# Patient Record
Sex: FEMALE | Race: WHITE | Employment: OTHER | URBAN - METROPOLITAN AREA
[De-identification: names, ages, dates, MRNs, and addresses within clinical notes are randomized per-mention and may not be internally consistent; named-entity substitution may affect disease eponyms.]

---

## 2017-01-16 ENCOUNTER — HOSPITAL ENCOUNTER (OUTPATIENT)
Dept: CT IMAGING | Age: 67
Discharge: HOME | End: 2017-01-16
Attending: INTERNAL MEDICINE
Payer: COMMERCIAL

## 2017-01-16 VITALS — HEIGHT: 67 IN | WEIGHT: 150 LBS | BODY MASS INDEX: 23.54 KG/M2

## 2017-01-16 DIAGNOSIS — R10.31 ABDOMINAL PAIN, RLQ: ICD-10-CM

## 2017-01-16 PROCEDURE — 74011000258 HC RX REV CODE- 258

## 2017-01-16 PROCEDURE — 74011636320 HC RX REV CODE- 636/320

## 2017-01-16 PROCEDURE — 74177 CT ABD & PELVIS W/CONTRAST: CPT

## 2017-01-16 RX ORDER — SODIUM CHLORIDE 0.9 % (FLUSH) 0.9 %
10 SYRINGE (ML) INJECTION
Status: COMPLETED | OUTPATIENT
Start: 2017-01-16 | End: 2017-01-16

## 2017-01-16 RX ADMIN — Medication 10 ML: at 15:20

## 2017-01-16 RX ADMIN — IOVERSOL 100 ML: 741 INJECTION INTRA-ARTERIAL; INTRAVENOUS at 15:20

## 2017-01-16 RX ADMIN — SODIUM CHLORIDE 100 ML: 900 INJECTION, SOLUTION INTRAVENOUS at 15:20

## 2017-01-16 RX ADMIN — DIATRIZOATE MEGLUMINE AND DIATRIZOATE SODIUM 15 ML: 660; 100 LIQUID ORAL; RECTAL at 15:20

## 2017-06-30 ENCOUNTER — HOSPITAL ENCOUNTER (OUTPATIENT)
Dept: MAMMOGRAPHY | Age: 67
Discharge: HOME | End: 2017-06-30
Attending: INTERNAL MEDICINE
Payer: COMMERCIAL

## 2017-06-30 DIAGNOSIS — Z12.31 ENCOUNTER FOR SCREENING MAMMOGRAM FOR BREAST CANCER: ICD-10-CM

## 2017-06-30 PROCEDURE — 77067 SCR MAMMO BI INCL CAD: CPT

## 2017-10-23 ENCOUNTER — HOSPITAL ENCOUNTER (OUTPATIENT)
Dept: CT IMAGING | Age: 67
Discharge: HOME | End: 2017-10-23
Attending: INTERNAL MEDICINE
Payer: SELF-PAY

## 2017-10-23 DIAGNOSIS — Z13.6 SCREENING FOR CARDIOVASCULAR CONDITION: ICD-10-CM

## 2017-10-23 PROCEDURE — 75571 CT HRT W/O DYE W/CA TEST: CPT

## 2017-10-24 NOTE — PROGRESS NOTES
Your heart calcium is ZERO! This is the best it can be! It doesn't 100% rule out blockages in your heart arteries, but it argues that holding off on statin medications is an acceptable plan for you. I know you must be relieved.

## 2018-02-13 PROBLEM — Z17.0 MALIGNANT NEOPLASM OF UPPER-OUTER QUADRANT OF LEFT BREAST IN FEMALE, ESTROGEN RECEPTOR POSITIVE (HCC): Status: ACTIVE | Noted: 2018-02-13

## 2018-02-13 PROBLEM — C50.412 MALIGNANT NEOPLASM OF UPPER-OUTER QUADRANT OF LEFT BREAST IN FEMALE, ESTROGEN RECEPTOR POSITIVE (HCC): Status: ACTIVE | Noted: 2018-02-13

## 2018-02-20 ENCOUNTER — HOSPITAL ENCOUNTER (OUTPATIENT)
Dept: ULTRASOUND IMAGING | Age: 68
Discharge: HOME | End: 2018-02-20
Attending: INTERNAL MEDICINE
Payer: MEDICARE

## 2018-02-20 DIAGNOSIS — E04.2 MULTIPLE THYROID NODULES: ICD-10-CM

## 2018-02-20 PROCEDURE — 76536 US EXAM OF HEAD AND NECK: CPT

## 2018-02-20 NOTE — PROGRESS NOTES
Your ultrasound looked good--the nodules do NOT appear concerning to the radiologist. We can consider repeating this test in several years for a spot check.

## 2018-03-20 PROBLEM — R20.0 THIGH NUMBNESS: Status: ACTIVE | Noted: 2018-03-20

## 2018-03-20 PROBLEM — M25.551 PAIN OF RIGHT HIP JOINT: Status: ACTIVE | Noted: 2018-03-20

## 2018-03-20 PROBLEM — G62.9 NEUROPATHY: Status: ACTIVE | Noted: 2018-03-20

## 2018-03-20 PROBLEM — R51.9 SCALP PAIN: Status: ACTIVE | Noted: 2018-03-20

## 2018-03-20 PROBLEM — L29.9 SCALP ITCH: Status: ACTIVE | Noted: 2018-03-20

## 2018-03-20 PROBLEM — Z79.899 ENCOUNTER FOR MEDICATION MANAGEMENT: Status: ACTIVE | Noted: 2018-03-20

## 2018-07-25 PROBLEM — G62.9 SENSORY NEUROPATHY: Status: ACTIVE | Noted: 2018-07-25

## 2018-10-01 PROBLEM — M79.2 NEUROPATHIC PAIN: Status: ACTIVE | Noted: 2018-10-01

## 2019-01-03 ENCOUNTER — HOSPITAL ENCOUNTER (OUTPATIENT)
Dept: MAMMOGRAPHY | Age: 69
Discharge: HOME | End: 2019-01-03
Attending: INTERNAL MEDICINE
Payer: MEDICARE

## 2019-01-03 DIAGNOSIS — Z12.39 SCREENING FOR BREAST CANCER: ICD-10-CM

## 2019-01-03 PROCEDURE — 77067 SCR MAMMO BI INCL CAD: CPT

## 2019-08-30 ENCOUNTER — HOSPITAL ENCOUNTER (OUTPATIENT)
Dept: CT IMAGING | Age: 69
Discharge: HOME | End: 2019-08-30
Attending: INTERNAL MEDICINE

## 2019-08-30 DIAGNOSIS — R10.9 RIGHT SIDED ABDOMINAL PAIN: ICD-10-CM

## 2019-08-30 RX ORDER — SODIUM CHLORIDE 0.9 % (FLUSH) 0.9 %
10 SYRINGE (ML) INJECTION
Status: COMPLETED | OUTPATIENT
Start: 2019-08-30 | End: 2019-08-30

## 2019-08-30 RX ADMIN — Medication 10 ML: at 09:56

## 2019-08-30 NOTE — PROGRESS NOTES
Your CT scan is normal.  Very reassuring that the pain is from scar tissue/adhesions and NOT an overlooked problem. Glad we checked for certainty.

## 2019-12-05 ENCOUNTER — HOSPITAL ENCOUNTER (OUTPATIENT)
Dept: LAB | Age: 69
Discharge: HOME | End: 2019-12-05
Payer: MEDICARE

## 2019-12-05 LAB — TSH SERPL DL<=0.005 MIU/L-ACNC: 0.94 UIU/ML (ref 0.36–3.74)

## 2019-12-05 PROCEDURE — 84443 ASSAY THYROID STIM HORMONE: CPT

## 2020-01-06 ENCOUNTER — HOSPITAL ENCOUNTER (OUTPATIENT)
Dept: MAMMOGRAPHY | Age: 70
Discharge: HOME | End: 2020-01-06
Attending: INTERNAL MEDICINE
Payer: MEDICARE

## 2020-01-06 DIAGNOSIS — Z12.31 ENCOUNTER FOR SCREENING MAMMOGRAM FOR MALIGNANT NEOPLASM OF BREAST: ICD-10-CM

## 2020-01-06 DIAGNOSIS — Z12.39 SCREENING FOR BREAST CANCER: ICD-10-CM

## 2020-01-06 PROCEDURE — 77063 BREAST TOMOSYNTHESIS BI: CPT

## 2020-06-10 ENCOUNTER — HOSPITAL ENCOUNTER (OUTPATIENT)
Dept: LAB | Age: 70
Discharge: HOME | End: 2020-06-10
Payer: MEDICARE

## 2020-06-10 DIAGNOSIS — E78.00 PURE HYPERCHOLESTEROLEMIA: ICD-10-CM

## 2020-06-10 DIAGNOSIS — E04.1 THYROID NODULE: ICD-10-CM

## 2020-06-10 DIAGNOSIS — E55.9 VITAMIN D DEFICIENCY: ICD-10-CM

## 2020-06-10 LAB — TSH SERPL DL<=0.005 MIU/L-ACNC: 0.81 UIU/ML (ref 0.36–3.74)

## 2020-06-10 PROCEDURE — 84443 ASSAY THYROID STIM HORMONE: CPT

## 2020-12-16 ENCOUNTER — TRANSCRIBE ORDER (OUTPATIENT)
Dept: SCHEDULING | Age: 70
End: 2020-12-16

## 2020-12-16 DIAGNOSIS — Z12.31 ENCOUNTER FOR SCREENING MAMMOGRAM FOR MALIGNANT NEOPLASM OF BREAST: Primary | ICD-10-CM

## 2021-01-07 ENCOUNTER — HOSPITAL ENCOUNTER (OUTPATIENT)
Dept: MAMMOGRAPHY | Age: 71
Discharge: HOME | End: 2021-01-07
Attending: INTERNAL MEDICINE
Payer: MEDICARE

## 2021-01-07 DIAGNOSIS — Z12.31 ENCOUNTER FOR SCREENING MAMMOGRAM FOR MALIGNANT NEOPLASM OF BREAST: ICD-10-CM

## 2021-01-07 PROCEDURE — 77063 BREAST TOMOSYNTHESIS BI: CPT

## 2021-12-27 ENCOUNTER — TRANSCRIBE ORDER (OUTPATIENT)
Dept: SCHEDULING | Age: 71
End: 2021-12-27

## 2021-12-27 DIAGNOSIS — Z12.31 SCREENING MAMMOGRAM FOR HIGH-RISK PATIENT: Primary | ICD-10-CM

## 2022-01-11 ENCOUNTER — HOSPITAL ENCOUNTER (OUTPATIENT)
Dept: MAMMOGRAPHY | Age: 72
Discharge: HOME | End: 2022-01-11
Attending: INTERNAL MEDICINE
Payer: MEDICARE

## 2022-01-11 DIAGNOSIS — Z12.31 SCREENING MAMMOGRAM FOR HIGH-RISK PATIENT: ICD-10-CM

## 2022-01-11 PROCEDURE — 77063 BREAST TOMOSYNTHESIS BI: CPT

## 2022-03-18 PROBLEM — G62.9 SENSORY NEUROPATHY: Status: ACTIVE | Noted: 2018-07-25

## 2022-03-18 PROBLEM — L29.9 SCALP ITCH: Status: ACTIVE | Noted: 2018-03-20

## 2022-03-19 PROBLEM — G62.9 NEUROPATHY: Status: ACTIVE | Noted: 2018-03-20

## 2022-03-19 PROBLEM — C50.412 MALIGNANT NEOPLASM OF UPPER-OUTER QUADRANT OF LEFT BREAST IN FEMALE, ESTROGEN RECEPTOR POSITIVE (HCC): Status: ACTIVE | Noted: 2018-02-13

## 2022-03-19 PROBLEM — Z79.899 ENCOUNTER FOR MEDICATION MANAGEMENT: Status: ACTIVE | Noted: 2018-03-20

## 2022-03-19 PROBLEM — R20.0 THIGH NUMBNESS: Status: ACTIVE | Noted: 2018-03-20

## 2022-03-19 PROBLEM — R51.9 SCALP PAIN: Status: ACTIVE | Noted: 2018-03-20

## 2022-03-19 PROBLEM — Z17.0 MALIGNANT NEOPLASM OF UPPER-OUTER QUADRANT OF LEFT BREAST IN FEMALE, ESTROGEN RECEPTOR POSITIVE (HCC): Status: ACTIVE | Noted: 2018-02-13

## 2022-03-20 PROBLEM — M79.2 NEUROPATHIC PAIN: Status: ACTIVE | Noted: 2018-10-01

## 2022-03-20 PROBLEM — M25.551 PAIN OF RIGHT HIP JOINT: Status: ACTIVE | Noted: 2018-03-20

## 2023-02-13 ENCOUNTER — HOSPITAL ENCOUNTER (EMERGENCY)
Age: 73
Discharge: HOME | End: 2023-02-13
Attending: STUDENT IN AN ORGANIZED HEALTH CARE EDUCATION/TRAINING PROGRAM
Payer: MEDICARE

## 2023-02-13 VITALS
HEART RATE: 96 BPM | SYSTOLIC BLOOD PRESSURE: 135 MMHG | HEIGHT: 65 IN | WEIGHT: 148 LBS | BODY MASS INDEX: 24.66 KG/M2 | TEMPERATURE: 97.8 F | RESPIRATION RATE: 23 BRPM | DIASTOLIC BLOOD PRESSURE: 89 MMHG | OXYGEN SATURATION: 96 %

## 2023-02-13 DIAGNOSIS — R11.2 NAUSEA AND VOMITING, UNSPECIFIED VOMITING TYPE: Primary | ICD-10-CM

## 2023-02-13 LAB
ALBUMIN SERPL-MCNC: 3.4 G/DL (ref 3.2–4.6)
ALBUMIN/GLOB SERPL: 0.9 (ref 0.4–1.6)
ALP SERPL-CCNC: 125 U/L (ref 50–136)
ALT SERPL-CCNC: 20 U/L (ref 12–65)
ANION GAP SERPL CALC-SCNC: 5 MMOL/L (ref 2–11)
APPEARANCE UR: CLEAR
AST SERPL-CCNC: 17 U/L (ref 15–37)
BASOPHILS # BLD: 0 K/UL (ref 0–0.2)
BASOPHILS NFR BLD: 1 % (ref 0–2)
BILIRUB SERPL-MCNC: 0.7 MG/DL (ref 0.2–1.1)
BILIRUB UR QL: NEGATIVE
BUN SERPL-MCNC: 9 MG/DL (ref 8–23)
CALCIUM SERPL-MCNC: 9 MG/DL (ref 8.3–10.4)
CHLORIDE SERPL-SCNC: 105 MMOL/L (ref 101–110)
CO2 SERPL-SCNC: 29 MMOL/L (ref 21–32)
COLOR UR: NORMAL
CREAT SERPL-MCNC: 0.62 MG/DL (ref 0.6–1)
DIFFERENTIAL METHOD BLD: ABNORMAL
EKG ATRIAL RATE: 87 BPM
EKG DIAGNOSIS: NORMAL
EKG P AXIS: 12 DEGREES
EKG P-R INTERVAL: 186 MS
EKG Q-T INTERVAL: 412 MS
EKG QRS DURATION: 101 MS
EKG QTC CALCULATION (BAZETT): 496 MS
EKG R AXIS: -22 DEGREES
EKG T AXIS: 35 DEGREES
EKG VENTRICULAR RATE: 87 BPM
EOSINOPHIL # BLD: 0.1 K/UL (ref 0–0.8)
EOSINOPHIL NFR BLD: 2 % (ref 0.5–7.8)
ERYTHROCYTE [DISTWIDTH] IN BLOOD BY AUTOMATED COUNT: 11.7 % (ref 11.9–14.6)
GLOBULIN SER CALC-MCNC: 3.6 G/DL (ref 2.8–4.5)
GLUCOSE SERPL-MCNC: 129 MG/DL (ref 65–100)
GLUCOSE UR STRIP.AUTO-MCNC: NEGATIVE MG/DL
HCT VFR BLD AUTO: 43.4 % (ref 35.8–46.3)
HGB BLD-MCNC: 14.9 G/DL (ref 11.7–15.4)
HGB UR QL STRIP: NEGATIVE
IMM GRANULOCYTES # BLD AUTO: 0 K/UL (ref 0–0.5)
IMM GRANULOCYTES NFR BLD AUTO: 1 % (ref 0–5)
KETONES UR QL STRIP.AUTO: NEGATIVE MG/DL
LEUKOCYTE ESTERASE UR QL STRIP.AUTO: NEGATIVE
LIPASE SERPL-CCNC: 136 U/L (ref 73–393)
LYMPHOCYTES # BLD: 1 K/UL (ref 0.5–4.6)
LYMPHOCYTES NFR BLD: 14 % (ref 13–44)
MAGNESIUM SERPL-MCNC: 2.1 MG/DL (ref 1.8–2.4)
MCH RBC QN AUTO: 31.6 PG (ref 26.1–32.9)
MCHC RBC AUTO-ENTMCNC: 34.3 G/DL (ref 31.4–35)
MCV RBC AUTO: 92.1 FL (ref 82–102)
MONOCYTES # BLD: 0.4 K/UL (ref 0.1–1.3)
MONOCYTES NFR BLD: 6 % (ref 4–12)
NEUTS SEG # BLD: 5.4 K/UL (ref 1.7–8.2)
NEUTS SEG NFR BLD: 77 % (ref 43–78)
NITRITE UR QL STRIP.AUTO: NEGATIVE
NRBC # BLD: 0 K/UL (ref 0–0.2)
PH UR STRIP: 8 (ref 5–9)
PLATELET # BLD AUTO: 204 K/UL (ref 150–450)
PMV BLD AUTO: 9.1 FL (ref 9.4–12.3)
POTASSIUM SERPL-SCNC: 3.5 MMOL/L (ref 3.5–5.1)
PROT SERPL-MCNC: 7 G/DL (ref 6.3–8.2)
PROT UR STRIP-MCNC: NEGATIVE MG/DL
RBC # BLD AUTO: 4.71 M/UL (ref 4.05–5.2)
SODIUM SERPL-SCNC: 139 MMOL/L (ref 133–143)
SP GR UR REFRACTOMETRY: 1.01 (ref 1–1.02)
UROBILINOGEN UR QL STRIP.AUTO: 0.2 EU/DL (ref 0.2–1)
WBC # BLD AUTO: 7 K/UL (ref 4.3–11.1)

## 2023-02-13 PROCEDURE — 81003 URINALYSIS AUTO W/O SCOPE: CPT

## 2023-02-13 PROCEDURE — 6360000002 HC RX W HCPCS: Performed by: STUDENT IN AN ORGANIZED HEALTH CARE EDUCATION/TRAINING PROGRAM

## 2023-02-13 PROCEDURE — 83690 ASSAY OF LIPASE: CPT

## 2023-02-13 PROCEDURE — 85025 COMPLETE CBC W/AUTO DIFF WBC: CPT

## 2023-02-13 PROCEDURE — 99284 EMERGENCY DEPT VISIT MOD MDM: CPT

## 2023-02-13 PROCEDURE — 83735 ASSAY OF MAGNESIUM: CPT

## 2023-02-13 PROCEDURE — 80053 COMPREHEN METABOLIC PANEL: CPT

## 2023-02-13 PROCEDURE — 96374 THER/PROPH/DIAG INJ IV PUSH: CPT

## 2023-02-13 RX ORDER — ONDANSETRON 4 MG/1
4 TABLET, FILM COATED ORAL 3 TIMES DAILY PRN
Qty: 15 TABLET | Refills: 2 | Status: SHIPPED | OUTPATIENT
Start: 2023-02-13

## 2023-02-13 RX ORDER — ONDANSETRON 2 MG/ML
4 INJECTION INTRAMUSCULAR; INTRAVENOUS
Status: COMPLETED | OUTPATIENT
Start: 2023-02-13 | End: 2023-02-13

## 2023-02-13 RX ADMIN — ONDANSETRON 4 MG: 2 INJECTION INTRAMUSCULAR; INTRAVENOUS at 12:17

## 2023-02-13 ASSESSMENT — ENCOUNTER SYMPTOMS
SHORTNESS OF BREATH: 0
EYE ITCHING: 0
ANAL BLEEDING: 0
COUGH: 0
CHEST TIGHTNESS: 0
ABDOMINAL DISTENTION: 0
ABDOMINAL PAIN: 0
BACK PAIN: 0
EYE PAIN: 0
WHEEZING: 0
APNEA: 0
EYE REDNESS: 0
RHINORRHEA: 0
DIARRHEA: 0
EYE DISCHARGE: 0
COLOR CHANGE: 0
SORE THROAT: 0
VOMITING: 1
NAUSEA: 1

## 2023-02-13 ASSESSMENT — PAIN DESCRIPTION - LOCATION: LOCATION: HEAD

## 2023-02-13 ASSESSMENT — PAIN - FUNCTIONAL ASSESSMENT: PAIN_FUNCTIONAL_ASSESSMENT: 0-10

## 2023-02-13 ASSESSMENT — PAIN SCALES - GENERAL: PAINLEVEL_OUTOF10: 2

## 2023-02-13 NOTE — ED TRIAGE NOTES
Patient arrives via EMS after having episodes of nausea and vomiting this AM. EMS gave 8mg zofran and fluids prior to arrival. Patient reports injury to ribs last week, has prescription for pain medications for pulled muscle secondary to vomiting. Patient also reported some constipation, had large BM today.

## 2023-02-13 NOTE — ED PROVIDER NOTES
Emergency Department Provider Note                   PCP:                LEONA Griggs               Age: 74 y.o. Sex: female     No diagnosis found. DISPOSITION          Medical Decision Making  66-year-old female patient presenting this department with reports of sudden onset nausea and vomiting. Patient was given Zofran prior to arrival and feels better. Vitals are stable, she has been experiencing similar symptoms for the past 2 weeks related to reports of \"food poisoning\". She is afebrile in this department. Her abdominal exam is unremarkable. She is receiving fluid bolus at this time. Reports some return of nausea. Orders placed for Zofran as well. We will wait labs and urinalysis. No indication for imaging at this time. Amount and/or Complexity of Data Reviewed  Independent Historian: EMS  External Data Reviewed: notes. Labs: ordered. ECG/medicine tests: ordered. Risk  Prescription drug management. ED Course as of 02/13/23 1309   Mon Feb 13, 2023   1218 EKG interpretation: Sinus rhythm, rate of 87, leftward axis, no ischemia. Occasional PACs present. [BR]      ED Course User Index  [BR] Kacie Lott DO        Orders Placed This Encounter   Procedures    CBC with Auto Differential    Comprehensive Metabolic Panel    Urinalysis    Magnesium    Lipase    EKG 12 Lead        Medications   ondansetron (ZOFRAN) injection 4 mg (4 mg IntraVENous Given 2/13/23 1217)       New Prescriptions    No medications on file        Sharmon Crigler is a 74 y.o. female who presents to the Emergency Department with chief complaint of    Chief Complaint   Patient presents with    Nausea    Emesis      66-year-old female patient presenting to this department with reports of nausea, vomiting. Patient states she has been experiencing symptoms of similar nature for the past 2 weeks.   She was seen in the outpatient setting by her primary care provider and diagnosed with \"food poisoning\". She states she experienced a muscle strain from vomiting so forcefully at that time as well and has been taking medication to treat her discomfort. This caused intermittent constipation as well. She takes a laxative at home as needed for constipation. Today in route to follow-up appointment, she became very nauseous, vomited several times. She subsequently had a large bowel movement as well. Patient was given a small fluid bolus by EMS as well as Zofran with improvement in her symptoms. She denies associated fever, chills, changes in urinary habits. She states she felt fairly well upon waking today. Patient denies known sick contacts. The history is provided by the patient and the EMS personnel. No  was used. Review of Systems   Constitutional:  Negative for activity change, appetite change, chills, fatigue and fever. HENT:  Negative for congestion, ear discharge, ear pain, rhinorrhea and sore throat. Eyes:  Negative for pain, discharge, redness, itching and visual disturbance. Respiratory:  Negative for apnea, cough, chest tightness, shortness of breath and wheezing. Cardiovascular:  Negative for chest pain, palpitations and leg swelling. Gastrointestinal:  Positive for nausea and vomiting. Negative for abdominal distention, abdominal pain, anal bleeding and diarrhea. Genitourinary:  Negative for difficulty urinating, dysuria, flank pain, frequency, hematuria, pelvic pain, vaginal bleeding and vaginal discharge. Musculoskeletal:  Negative for arthralgias, back pain, myalgias, neck pain and neck stiffness. Skin:  Negative for color change, pallor, rash and wound. Neurological:  Negative for dizziness, tremors, facial asymmetry, weakness, light-headedness, numbness and headaches. Psychiatric/Behavioral:  Negative for agitation, behavioral problems, confusion, self-injury and suicidal ideas. The patient is not nervous/anxious.     All other systems reviewed and are negative. Past Medical History:   Diagnosis Date    Allergic rhinitis     Anxiety     Arthritis     Asthma     pt uses inhaler daily    Breast cancer ([facility] [address] 1999    Lt Lumpectomy    Bronchitis     pt taking levaquin; started medication on 1/11/13    Cancer ([facility] [address] 1999    left breast; s/p left breast lumpectomy with chemo & radiation    Chronic sinusitis     Deviated nasal septum     GERD (gastroesophageal reflux disease)     controlled with medication    History of breast cancer in female 1999    left breast    Hypercholesterolemia     no meds    Hyperlipidemia     no meds    Hypertrophy of nasal turbinates     Malignant neoplasm of upper-outer quadrant of left breast in female, estrogen receptor positive (HCC) 2/13/2018    Nasal congestion     mucus/chest congestion/cough    Nasal obstruction     Nasal polyp     Psychiatric disorder     anxiety    Sensory neuropathy 7/25/2018    Thyroid nodule 12/7/2012    Thyroid nodule     Unspecified adverse effect of anesthesia     \"slow to wake up\"    Unspecified sleep apnea     \"possible\" per pt; does not use CPAP    Vitamin D deficiency     Weight gain 12/7/2012    Weight gain     30 lbs within last 6 years         Past Surgical History:   Procedure Laterality Date    BREAST LUMPECTOMY Left 1999    Lt Lumpectomy    CATARACT REMOVAL      bilat eyes    HYSTERECTOMY  1993    SEPTOPLASTY  01/21/2013    Septo / Claritza Copeland / Tremaine Hawking / nasal polypectomy     SINUS SURGERY PROC UNLISTED  1983    deviated septum surgery        Family History   Problem Relation Age of Onset    Heart Disease Father     Diabetes Father         Type II    Stroke Father     Panic Disorder Maternal Aunt     Cancer Paternal Aunt     Diabetes Maternal Grandfather     Osteoporosis Paternal Grandmother     Diabetes Paternal Grandfather     Asthma Child     Migraines Child     Other Mother         TBI 2012    Malig Hypertherm Neg Hx     Pseudochol.  Deficiency Neg Hx     Delayed Awakening Neg Hx     Post-op Nausea/Vomiting Neg Hx     Emergence Delirium Neg Hx     Post-op Cognitive Dysfunction Neg Hx     Breast Cancer Neg Hx         Social History     Socioeconomic History    Marital status: Single   Tobacco Use    Smoking status: Never    Smokeless tobacco: Never   Substance and Sexual Activity    Alcohol use: No    Drug use: No         Penicillins, Sulfa antibiotics, Codeine, Ciprofloxacin, Prednisone, and Triamcinolone acetonide     Previous Medications    ALBUTEROL SULFATE  (90 BASE) MCG/ACT INHALER    Inhale 2 puffs into the lungs every 6 hours as needed    ASCORBIC ACID (VITAMIN C) 500 MG TABLET    Take by mouth    BUDESONIDE-FORMOTEROL (SYMBICORT) 160-4.5 MCG/ACT AERO    INHALE 2 PUFFS TWICE A DAY    BUSPIRONE (BUSPAR) 10 MG TABLET    TAKE 1 TABLET BY MOUTH THREE (3) TIMES DAILY as needed. FLUTICASONE (FLONASE) 50 MCG/ACT NASAL SPRAY    SPRAY 1 SPRAY INTO EACH NOSTRIL EVERY DAY    LORATADINE (CLARITIN) 10 MG TABLET    1 TABLET ONCE A DAY AM ORALLY 30 DAY(S)    MICONAZOLE (MICOTIN) 2 % POWDER    Apply topically 3 times daily    MONTELUKAST (SINGULAIR) 10 MG TABLET    Take 10 mg by mouth daily    MUPIROCIN (BACTROBAN) 2 % OINTMENT    APPLY TO AFFECTED AREA TWICE A DAY    VITAMIN D3 (CHOLECALCIFEROL) 125 MCG (5000 UT) TABS TABLET    Take 5,000 Units by mouth daily        Vitals signs and nursing note reviewed. Patient Vitals for the past 4 hrs:   Temp Pulse Resp BP SpO2   02/13/23 1118 97.8 °F (36.6 °C) 84 16 (!) 144/89 96 %          Physical Exam  Vitals and nursing note reviewed. Constitutional:       General: She is not in acute distress. Appearance: Normal appearance. She is normal weight. She is not ill-appearing or toxic-appearing. Comments: Generally well-appearing, alert and oriented x4. No acute distress, speaks in clear, fluid sentences. HENT:      Head: Normocephalic and atraumatic.       Right Ear: External ear normal.      Left Ear: External ear normal.      Nose: Nose normal.      Mouth/Throat:      Mouth: Mucous membranes are moist.   Eyes:      General: No scleral icterus. Right eye: No discharge. Left eye: No discharge. Extraocular Movements: Extraocular movements intact. Cardiovascular:      Rate and Rhythm: Normal rate and regular rhythm. Pulses: Normal pulses. Heart sounds: Normal heart sounds. Pulmonary:      Effort: Pulmonary effort is normal. No tachypnea, bradypnea, accessory muscle usage, prolonged expiration or respiratory distress. Breath sounds: Normal breath sounds and air entry. No stridor. No decreased breath sounds, wheezing, rhonchi or rales. Abdominal:      General: Abdomen is flat. There is no distension. Palpations: There is no mass. Tenderness: There is no abdominal tenderness. There is no right CVA tenderness, left CVA tenderness, guarding or rebound. Negative signs include Wise's sign and McBurney's sign. Hernia: No hernia is present. Musculoskeletal:         General: No swelling, tenderness or deformity. Normal range of motion. Cervical back: Normal range of motion. Skin:     General: Skin is warm. Capillary Refill: Capillary refill takes less than 2 seconds. Neurological:      General: No focal deficit present. Mental Status: She is alert.    Psychiatric:         Mood and Affect: Mood normal.        Procedures    Results for orders placed or performed during the hospital encounter of 02/13/23   CBC with Auto Differential   Result Value Ref Range    WBC 7.0 4.3 - 11.1 K/uL    RBC 4.71 4.05 - 5.2 M/uL    Hemoglobin 14.9 11.7 - 15.4 g/dL    Hematocrit 43.4 35.8 - 46.3 %    MCV 92.1 82.0 - 102.0 FL    MCH 31.6 26.1 - 32.9 PG    MCHC 34.3 31.4 - 35.0 g/dL    RDW 11.7 (L) 11.9 - 14.6 %    Platelets 243 049 - 401 K/uL    MPV 9.1 (L) 9.4 - 12.3 FL    nRBC 0.00 0.0 - 0.2 K/uL    Differential Type AUTOMATED      Seg Neutrophils 77 43 - 78 %    Lymphocytes 14 13 - 44 %    Monocytes 6 4.0 - 12.0 %    Eosinophils % 2 0.5 - 7.8 %    Basophils 1 0.0 - 2.0 %    Immature Granulocytes 1 0.0 - 5.0 %    Segs Absolute 5.4 1.7 - 8.2 K/UL    Absolute Lymph # 1.0 0.5 - 4.6 K/UL    Absolute Mono # 0.4 0.1 - 1.3 K/UL    Absolute Eos # 0.1 0.0 - 0.8 K/UL    Basophils Absolute 0.0 0.0 - 0.2 K/UL    Absolute Immature Granulocyte 0.0 0.0 - 0.5 K/UL   Comprehensive Metabolic Panel   Result Value Ref Range    Sodium 139 133 - 143 mmol/L    Potassium 3.5 3.5 - 5.1 mmol/L    Chloride 105 101 - 110 mmol/L    CO2 29 21 - 32 mmol/L    Anion Gap 5 2 - 11 mmol/L    Glucose 129 (H) 65 - 100 mg/dL    BUN 9 8 - 23 MG/DL    Creatinine 0.62 0.6 - 1.0 MG/DL    Est, Glom Filt Rate >60 >60 ml/min/1.73m2    Calcium 9.0 8.3 - 10.4 MG/DL    Total Bilirubin 0.7 0.2 - 1.1 MG/DL    ALT 20 12 - 65 U/L    AST 17 15 - 37 U/L    Alk Phosphatase 125 50 - 136 U/L    Total Protein 7.0 6.3 - 8.2 g/dL    Albumin 3.4 3.2 - 4.6 g/dL    Globulin 3.6 2.8 - 4.5 g/dL    Albumin/Globulin Ratio 0.9 0.4 - 1.6     Urinalysis   Result Value Ref Range    Color, UA YELLOW/STRAW      Appearance CLEAR      Specific Gravity, UA 1.007 1.001 - 1.023      pH, Urine 8.0 5.0 - 9.0      Protein, UA Negative NEG mg/dL    Glucose, UA Negative mg/dL    Ketones, Urine Negative NEG mg/dL    Bilirubin Urine Negative NEG      Blood, Urine Negative NEG      Urobilinogen, Urine 0.2 0.2 - 1.0 EU/dL    Nitrite, Urine Negative NEG      Leukocyte Esterase, Urine Negative NEG     Magnesium   Result Value Ref Range    Magnesium 2.1 1.8 - 2.4 mg/dL   Lipase   Result Value Ref Range    Lipase 136 73 - 393 U/L   EKG 12 Lead   Result Value Ref Range    Ventricular Rate 87 BPM    Atrial Rate 87 BPM    P-R Interval 186 ms    QRS Duration 101 ms    Q-T Interval 412 ms    QTc Calculation (Bazett) 496 ms    P Axis 12 degrees    R Axis -22 degrees    T Axis 35 degrees    Diagnosis Sinus rhythm         No orders to display                       Voice dictation software was used during the making of this note. This software is not perfect and grammatical and other typographical errors may be present. This note has not been completely proofread for errors.      Dora Hernandez, DO  02/13/23 1311       Dora Hernandez, DO  02/13/23 1312

## 2023-02-13 NOTE — ED NOTES
I have reviewed discharge instructions with the patient. The patient verbalized understanding. Patient left ED via Discharge Method: ambulatory to Home with (insert name of family/friend, self, transport friend). Opportunity for questions and clarification provided. Patient given 1 scripts. To continue your aftercare when you leave the hospital, you may receive an automated call from our care team to check in on how you are doing. This is a free service and part of our promise to provide the best care and service to meet your aftercare needs.  If you have questions, or wish to unsubscribe from this service please call [phone]. Thank you for Choosing our [facility] Emergency Department.        Anupama Hammond RN  02/13/23 [id number]

## 2023-02-13 NOTE — DISCHARGE INSTRUCTIONS
Take the medication prescribed as directed. Drink plenty clear liquids to ensure hydration. Arrange follow-up with your provider this week as discussed. Return for worsening symptoms, concerns or questions.

## 2023-02-28 ENCOUNTER — HOSPITAL ENCOUNTER (INPATIENT)
Age: 73
LOS: 1 days | Discharge: HOME | DRG: 069 | End: 2023-03-02
Attending: EMERGENCY MEDICINE | Admitting: INTERNAL MEDICINE
Payer: MEDICARE

## 2023-02-28 ENCOUNTER — APPOINTMENT (OUTPATIENT)
Dept: CT IMAGING | Age: 73
DRG: 069 | End: 2023-02-28
Payer: MEDICARE

## 2023-02-28 DIAGNOSIS — I48.0 PAROXYSMAL ATRIAL FIBRILLATION (HCC): ICD-10-CM

## 2023-02-28 DIAGNOSIS — R42 DIZZINESS: ICD-10-CM

## 2023-02-28 DIAGNOSIS — R07.9 ACUTE CHEST PAIN: ICD-10-CM

## 2023-02-28 DIAGNOSIS — G45.9 TIA (TRANSIENT ISCHEMIC ATTACK): Primary | ICD-10-CM

## 2023-02-28 LAB
ALBUMIN SERPL-MCNC: 4.1 G/DL (ref 3.2–4.6)
ALBUMIN/GLOB SERPL: 1 (ref 0.4–1.6)
ALP SERPL-CCNC: 116 U/L (ref 50–136)
ALT SERPL-CCNC: 23 U/L (ref 12–65)
ANION GAP SERPL CALC-SCNC: 6 MMOL/L (ref 2–11)
AST SERPL-CCNC: 17 U/L (ref 15–37)
BASOPHILS # BLD: 0 K/UL (ref 0–0.2)
BASOPHILS NFR BLD: 1 % (ref 0–2)
BILIRUB SERPL-MCNC: 1.3 MG/DL (ref 0.2–1.1)
BUN SERPL-MCNC: 13 MG/DL (ref 8–23)
CALCIUM SERPL-MCNC: 9.5 MG/DL (ref 8.3–10.4)
CHLORIDE SERPL-SCNC: 104 MMOL/L (ref 101–110)
CO2 SERPL-SCNC: 29 MMOL/L (ref 21–32)
CREAT SERPL-MCNC: 0.72 MG/DL (ref 0.6–1)
DIFFERENTIAL METHOD BLD: ABNORMAL
EKG ATRIAL RATE: 138 BPM
EKG ATRIAL RATE: 78 BPM
EKG DIAGNOSIS: NORMAL
EKG DIAGNOSIS: NORMAL
EKG P AXIS: 57 DEGREES
EKG P-R INTERVAL: 193 MS
EKG Q-T INTERVAL: 344 MS
EKG Q-T INTERVAL: 418 MS
EKG QRS DURATION: 100 MS
EKG QRS DURATION: 84 MS
EKG QTC CALCULATION (BAZETT): 474 MS
EKG QTC CALCULATION (BAZETT): 477 MS
EKG R AXIS: -15 DEGREES
EKG R AXIS: 47 DEGREES
EKG T AXIS: 10 DEGREES
EKG T AXIS: 26 DEGREES
EKG VENTRICULAR RATE: 114 BPM
EKG VENTRICULAR RATE: 78 BPM
EOSINOPHIL # BLD: 0.1 K/UL (ref 0–0.8)
EOSINOPHIL NFR BLD: 1 % (ref 0.5–7.8)
ERYTHROCYTE [DISTWIDTH] IN BLOOD BY AUTOMATED COUNT: 12.1 % (ref 11.9–14.6)
GLOBULIN SER CALC-MCNC: 4 G/DL (ref 2.8–4.5)
GLUCOSE BLD STRIP.AUTO-MCNC: 108 MG/DL (ref 65–100)
GLUCOSE SERPL-MCNC: 110 MG/DL (ref 65–100)
HCT VFR BLD AUTO: 46.9 % (ref 35.8–46.3)
HGB BLD-MCNC: 15.9 G/DL (ref 11.7–15.4)
IMM GRANULOCYTES # BLD AUTO: 0 K/UL (ref 0–0.5)
IMM GRANULOCYTES NFR BLD AUTO: 0 % (ref 0–5)
LYMPHOCYTES # BLD: 1.6 K/UL (ref 0.5–4.6)
LYMPHOCYTES NFR BLD: 20 % (ref 13–44)
MCH RBC QN AUTO: 31.5 PG (ref 26.1–32.9)
MCHC RBC AUTO-ENTMCNC: 33.9 G/DL (ref 31.4–35)
MCV RBC AUTO: 92.9 FL (ref 82–102)
MONOCYTES # BLD: 0.6 K/UL (ref 0.1–1.3)
MONOCYTES NFR BLD: 8 % (ref 4–12)
NEUTS SEG # BLD: 5.9 K/UL (ref 1.7–8.2)
NEUTS SEG NFR BLD: 71 % (ref 43–78)
NRBC # BLD: 0 K/UL (ref 0–0.2)
PLATELET # BLD AUTO: 259 K/UL (ref 150–450)
PMV BLD AUTO: 9.9 FL (ref 9.4–12.3)
POTASSIUM SERPL-SCNC: 3.9 MMOL/L (ref 3.5–5.1)
PROT SERPL-MCNC: 8.1 G/DL (ref 6.3–8.2)
RBC # BLD AUTO: 5.05 M/UL (ref 4.05–5.2)
SERVICE CMNT-IMP: ABNORMAL
SODIUM SERPL-SCNC: 139 MMOL/L (ref 133–143)
TROPONIN I SERPL HS-MCNC: 8 PG/ML (ref 0–14)
WBC # BLD AUTO: 8.3 K/UL (ref 4.3–11.1)

## 2023-02-28 PROCEDURE — 82962 GLUCOSE BLOOD TEST: CPT

## 2023-02-28 PROCEDURE — 94761 N-INVAS EAR/PLS OXIMETRY MLT: CPT

## 2023-02-28 PROCEDURE — 80053 COMPREHEN METABOLIC PANEL: CPT

## 2023-02-28 PROCEDURE — 6370000000 HC RX 637 (ALT 250 FOR IP): Performed by: NURSE PRACTITIONER

## 2023-02-28 PROCEDURE — G0378 HOSPITAL OBSERVATION PER HR: HCPCS

## 2023-02-28 PROCEDURE — 99285 EMERGENCY DEPT VISIT HI MDM: CPT

## 2023-02-28 PROCEDURE — 94760 N-INVAS EAR/PLS OXIMETRY 1: CPT

## 2023-02-28 PROCEDURE — 70450 CT HEAD/BRAIN W/O DYE: CPT

## 2023-02-28 PROCEDURE — 84484 ASSAY OF TROPONIN QUANT: CPT

## 2023-02-28 PROCEDURE — 93005 ELECTROCARDIOGRAM TRACING: CPT | Performed by: EMERGENCY MEDICINE

## 2023-02-28 PROCEDURE — 85025 COMPLETE CBC W/AUTO DIFF WBC: CPT

## 2023-02-28 RX ORDER — MONTELUKAST SODIUM 10 MG/1
10 TABLET ORAL
Status: DISCONTINUED | OUTPATIENT
Start: 2023-02-28 | End: 2023-03-02 | Stop reason: HOSPADM

## 2023-02-28 RX ORDER — ONDANSETRON 2 MG/ML
4 INJECTION INTRAMUSCULAR; INTRAVENOUS EVERY 6 HOURS PRN
Status: DISCONTINUED | OUTPATIENT
Start: 2023-02-28 | End: 2023-03-02 | Stop reason: HOSPADM

## 2023-02-28 RX ORDER — ENOXAPARIN SODIUM 100 MG/ML
40 INJECTION SUBCUTANEOUS EVERY 24 HOURS
Status: DISCONTINUED | OUTPATIENT
Start: 2023-02-28 | End: 2023-02-28

## 2023-02-28 RX ORDER — ALBUTEROL SULFATE 90 UG/1
2 AEROSOL, METERED RESPIRATORY (INHALATION) EVERY 6 HOURS PRN
Status: DISCONTINUED | OUTPATIENT
Start: 2023-02-28 | End: 2023-03-02 | Stop reason: HOSPADM

## 2023-02-28 RX ORDER — ATORVASTATIN CALCIUM 40 MG/1
80 TABLET, FILM COATED ORAL NIGHTLY
Status: DISCONTINUED | OUTPATIENT
Start: 2023-02-28 | End: 2023-03-02 | Stop reason: HOSPADM

## 2023-02-28 RX ORDER — BUSPIRONE HYDROCHLORIDE 5 MG/1
10 TABLET ORAL 2 TIMES DAILY
Status: DISCONTINUED | OUTPATIENT
Start: 2023-02-28 | End: 2023-03-01

## 2023-02-28 RX ORDER — ASPIRIN 81 MG/1
81 TABLET ORAL DAILY
Status: DISCONTINUED | OUTPATIENT
Start: 2023-02-28 | End: 2023-03-02 | Stop reason: HOSPADM

## 2023-02-28 RX ORDER — ONDANSETRON 4 MG/1
4 TABLET, ORALLY DISINTEGRATING ORAL EVERY 8 HOURS PRN
Status: DISCONTINUED | OUTPATIENT
Start: 2023-02-28 | End: 2023-03-02 | Stop reason: HOSPADM

## 2023-02-28 RX ORDER — LORAZEPAM 1 MG/1
1 TABLET ORAL ONCE
Status: DISCONTINUED | OUTPATIENT
Start: 2023-02-28 | End: 2023-03-01

## 2023-02-28 RX ORDER — ASPIRIN 300 MG/1
300 SUPPOSITORY RECTAL DAILY
Status: DISCONTINUED | OUTPATIENT
Start: 2023-02-28 | End: 2023-03-01 | Stop reason: SDUPTHER

## 2023-02-28 RX ORDER — POLYETHYLENE GLYCOL 3350 17 G/17G
17 POWDER, FOR SOLUTION ORAL DAILY PRN
Status: DISCONTINUED | OUTPATIENT
Start: 2023-02-28 | End: 2023-03-02 | Stop reason: HOSPADM

## 2023-02-28 RX ADMIN — MONTELUKAST 10 MG: 10 TABLET, FILM COATED ORAL at 22:53

## 2023-02-28 RX ADMIN — ASPIRIN 81 MG: 81 TABLET, COATED ORAL at 22:53

## 2023-02-28 RX ADMIN — ATORVASTATIN CALCIUM 80 MG: 40 TABLET, FILM COATED ORAL at 22:53

## 2023-02-28 RX ADMIN — APIXABAN 5 MG: 5 TABLET, FILM COATED ORAL at 22:53

## 2023-02-28 ASSESSMENT — ENCOUNTER SYMPTOMS
DIARRHEA: 0
COUGH: 0
ABDOMINAL PAIN: 0
VOMITING: 0
SHORTNESS OF BREATH: 0
NAUSEA: 0

## 2023-02-28 ASSESSMENT — PAIN SCALES - GENERAL
PAINLEVEL_OUTOF10: 0
PAINLEVEL_OUTOF10: 0

## 2023-02-28 NOTE — ED NOTES
TRANSFER - OUT REPORT:    Verbal report given to Carlos solitario RN on Cha Mederos  being transferred to [address] [place] [address] for urgent transfer       Report consisted of patient's Situation, Background, Assessment and   Recommendations(SBAR). Information from the following report(s) Nurse Handoff Report, Index, ED Encounter Summary, ED SBAR, Intake/Output, MAR, and Recent Results was reviewed with the receiving nurse. Hulbert Assessment: Presents to emergency department  because of falls (Syncope, seizure, or loss of consciousness): No, Age > 79: No, Altered Mental Status, Intoxication with alcohol or substance confusion (Disorientation, impaired judgment, poor safety awaremess, or inability to follow instructions): No, Impaired Mobility: Ambulates or transfers with assistive devices or assistance; Unable to ambulate or transer.: No  Lines:   Peripheral IV 02/28/23 Left Antecubital (Active)   Site Assessment Clean, dry & intact 02/28/23 1135   Line Status Brisk blood return 02/28/23 Trg Romel 4 checked and tightened 02/28/23 1135   Phlebitis Assessment No symptoms 02/28/23 1135   Infiltration Assessment 0 02/28/23 1135   Alcohol Cap Used No 02/28/23 1135   Dressing Status Clean, dry & intact 02/28/23 1135   Dressing Type Transparent 02/28/23 1135   Dressing Intervention New 02/28/23 1135        Opportunity for questions and clarification was provided.       Patient transported with:  Sukhjinder Carrasco RN  02/28/23 [id number]

## 2023-02-28 NOTE — PROGRESS NOTES
TRANSFER - IN REPORT:    Verbal report received from Ana Oliveros, [facility] on Néstor Casey  being received from ED for routine progression of patient care      Report consisted of patient's Situation, Background, Assessment and   Recommendations(SBAR). Information from the following report(s)  SBAR was reviewed with the receiving nurse. Opportunity for questions and clarification was provided. Assessment completed upon patient's arrival to unit and care assumed.

## 2023-02-28 NOTE — H&P
Hospitalist History and Physical   Admit Date:  2023 11:42 AM   Name:  Keron Thomas   Age:  74 y.o. Sex:  female  :  1950   MRN:  [id number]   Room:  361/01    Presenting Complaint: Numbness     Reason(s) for Admission: TIA (transient ischemic attack) [G45.9]  Dizziness [R42]  Paroxysmal atrial fibrillation ([facility] [address] [I48.0]  Acute chest pain [R07.9]     History of Present Illness:   Keron Thomas is a 74 y.o. female with medical history of breast cancer, anxiety, asthma, hyperlipidemia who presented with c/o dizziness and facial numbness. She reports an episode of nausea, dizziness and heart palpitations starting around 2030 last night and lasting approximately 2 hours. This morning she reports onset of right facial numbness at the corner of her mouth which has resolved. CT head without acute findings. Neurology evaluated virtually in ER. Pt not TPA candidate. Symptoms have resolved. Pt found to be in afib. Has had a 2 week holter monitor on for similar complaints outpatient and was found to be in afib, pending Cardiology follow up. Denies CP, SOB, n/v/d, abd pain. Assessment & Plan:     Principal Problem:    Dizziness  CT head neg for acute findings. Neuro evaluated virtually in ER  ASA  Statin  MRI brain  Echo  PT/OT  Lipid panel  A1C      Afib, newly diagnosed  Will start eliquis  Consult Cardiology  Remote tele  Echo    Asthma  Home inhalers      PT/OT evals and PPD needed/ordered? Yes  Diet: regular   VTE prophylaxis: Lovenox  Code status: Full Code    Hospital Problems:  Principal Problem:    Dizziness  Resolved Problems:    * No resolved hospital problems.  *       Past History:     Past Medical History:   Diagnosis Date    Allergic rhinitis     Anxiety     Arthritis     Asthma     pt uses inhaler daily    Breast cancer ([facility] [address]     Lt Lumpectomy    Bronchitis     pt taking levaquin; started medication on 13    Cancer ([facility] [address]     left breast; s/p left breast lumpectomy with chemo & radiation    Chronic sinusitis     Deviated nasal septum     GERD (gastroesophageal reflux disease)     controlled with medication    History of breast cancer in female 1999    left breast    Hypercholesterolemia     no meds    Hyperlipidemia     no meds    Hypertrophy of nasal turbinates     Malignant neoplasm of upper-outer quadrant of left breast in female, estrogen receptor positive ([facility] [address] 2/13/2018    Nasal congestion     mucus/chest congestion/cough    Nasal obstruction     Nasal polyp     Psychiatric disorder     anxiety    Sensory neuropathy 7/25/2018    Thyroid nodule 12/7/2012    Thyroid nodule     Unspecified adverse effect of anesthesia     \"slow to wake up\"    Unspecified sleep apnea     \"possible\" per pt; does not use CPAP    Vitamin D deficiency     Weight gain 12/7/2012    Weight gain     30 lbs within last 6 years        Past Surgical History:   Procedure Laterality Date    BREAST LUMPECTOMY Left 1999    Lt Lumpectomy    CATARACT REMOVAL      bilat eyes    HYSTERECTOMY (CERVIX STATUS UNKNOWN)  1993    SEPTOPLASTY  01/21/2013    Septo / Norberto Cortes / Christine Kent / nasal polypectomy     SINUS SURGERY PROC UNLISTED  1983    deviated septum surgery        Social History     Tobacco Use    Smoking status: Never    Smokeless tobacco: Never   Substance Use Topics    Alcohol use: No      Social History     Substance and Sexual Activity   Drug Use No       Family History   Problem Relation Age of Onset    Heart Disease Father     Diabetes Father         Type II    Stroke Father     Panic Disorder Maternal Aunt     Cancer Paternal Aunt     Diabetes Maternal Grandfather     Osteoporosis Paternal Grandmother     Diabetes Paternal Grandfather     Asthma Child     Migraines Child     Other Mother         TBI 2012    Malig Hypertherm Neg Hx     Pseudochol.  Deficiency Neg Hx     Delayed Awakening Neg Hx     Post-op Nausea/Vomiting Neg Hx     Emergence Delirium Neg Hx     Post-op Cognitive Dysfunction Neg Hx Breast Cancer Neg Hx         Immunization History   Administered Date(s) Administered    COVID-19, MODERNA BLUE border, Primary or Immunocompromised, (age 12y+), IM, 100 mcg/0.5mL 05/25/2021    Influenza Trivalent 10/22/2010, 10/21/2011    Pneumococcal Conjugate 13-valent ([id number]) 07/08/2016    Pneumococcal Polysaccharide ([id number]) 12/09/2013    Tdap (Boostrix, Adacel) 07/07/2015    Zoster Live (Zostavax) 05/01/2016     Allergies   Allergen Reactions    Penicillins Itching, Rash and Shortness Of Breath    Sulfa Antibiotics Anaphylaxis, Itching, Other (See Comments), Rash and Swelling     Edema (face, throat swelling)    Codeine Itching     Bilateral feet    Ciprofloxacin Nausea Only    Prednisone Itching     \"Racing heart\", \"like the top of my head was going to blow off\"    Triamcinolone Acetonide Other (See Comments)     Injection, noted itchiness, red face     Prior to Admit Medications:  Current Outpatient Medications   Medication Instructions    albuterol sulfate  (90 Base) MCG/ACT inhaler 2 puffs, Inhalation, EVERY 6 HOURS PRN    ascorbic acid (VITAMIN C) 500 MG tablet Oral    budesonide-formoterol (SYMBICORT) 160-4.5 MCG/ACT AERO INHALE 2 PUFFS TWICE A DAY    busPIRone (BUSPAR) 10 MG tablet TAKE 1 TABLET BY MOUTH THREE (3) TIMES DAILY as needed.     fluticasone (FLONASE) 50 MCG/ACT nasal spray SPRAY 1 SPRAY INTO EACH NOSTRIL EVERY DAY    loratadine (CLARITIN) 10 MG tablet 1 TABLET ONCE A DAY AM ORALLY 30 DAY(S)    miconazole (MICOTIN) 2 % powder Topical, 3 TIMES DAILY    montelukast (SINGULAIR) 10 mg, Oral, DAILY    mupirocin (BACTROBAN) 2 % ointment APPLY TO AFFECTED AREA TWICE A DAY    ondansetron (ZOFRAN) 4 mg, Oral, 3 TIMES DAILY PRN    vitamin D3 (CHOLECALCIFEROL) 5,000 Units, Oral, DAILY         Objective:   Patient Vitals for the past 24 hrs:   Temp Pulse Resp BP SpO2   02/28/23 1340 98.8 °F (37.1 °C) 78 18 -- 97 %   02/28/23 1336 -- 80 18 -- 94 %   02/28/23 1233 -- 82 18 113/80 97 % 02/28/23 1213 -- 84 22 128/75 98 %   02/28/23 1209 -- 78 19 125/88 97 %   02/28/23 1126 -- 54 18 (!) 142/96 99 %       Oxygen Therapy  SpO2: 97 %    Estimated body mass index is 24.63 kg/m² as calculated from the following:    Height as of 2/13/23: 5' 5\" (1.651 m). Weight as of 2/13/23: 148 lb (67.1 kg). No intake or output data in the 24 hours ending 02/28/23 1528      Physical Exam:    Blood pressure 113/80, pulse 78, temperature 98.8 °F (37.1 °C), temperature source Oral, resp. rate 18, SpO2 97 %. General:    Well nourished. Head:  Normocephalic, atraumatic  Eyes:  Sclerae appear normal.  Pupils equally round. ENT:  Nares appear normal.  Moist oral mucosa  Neck:  No restricted ROM. Trachea midline   CV:   RRR. No m/r/g. No jugular venous distension. Lungs:   CTAB. No wheezing, rhonchi, or rales. Symmetric expansion. Abdomen:   Soft, nontender, nondistended. Extremities: No cyanosis or clubbing. No edema  Skin:     No rashes and normal coloration. Warm and dry. Neuro:  CN II-XII grossly intact. Sensation intact. A/O X3. Facial expressions symmetrical. EOMs intact. Finger to nose and heel to shin intact. Bilateral UE/LE strength 5/5 no drift  Psych:  Normal mood and affect.       I have personally reviewed labs and tests:  Recent Labs:  Recent Results (from the past 24 hour(s))   EKG 12 Lead    Collection Time: 02/28/23 11:28 AM   Result Value Ref Range    Ventricular Rate 114 BPM    Atrial Rate 138 BPM    QRS Duration 84 ms    Q-T Interval 344 ms    QTc Calculation (Bazett) 474 ms    R Axis 47 degrees    T Axis 10 degrees    Diagnosis       Atrial fibrillation with rapid ventricular response   CBC with Auto Differential    Collection Time: 02/28/23 11:36 AM   Result Value Ref Range    WBC 8.3 4.3 - 11.1 K/uL    RBC 5.05 4.05 - 5.2 M/uL    Hemoglobin 15.9 (H) 11.7 - 15.4 g/dL    Hematocrit 46.9 (H) 35.8 - 46.3 %    MCV 92.9 82.0 - 102.0 FL    MCH 31.5 26.1 - 32.9 PG    MCHC 33.9 31.4 - 35.0 g/dL    RDW 12.1 11.9 - 14.6 %    Platelets 259 150 - 450 K/uL    MPV 9.9 9.4 - 12.3 FL    nRBC 0.00 0.0 - 0.2 K/uL    Differential Type AUTOMATED      Seg Neutrophils 71 43 - 78 %    Lymphocytes 20 13 - 44 %    Monocytes 8 4.0 - 12.0 %    Eosinophils % 1 0.5 - 7.8 %    Basophils 1 0.0 - 2.0 %    Immature Granulocytes 0 0.0 - 5.0 %    Segs Absolute 5.9 1.7 - 8.2 K/UL    Absolute Lymph # 1.6 0.5 - 4.6 K/UL    Absolute Mono # 0.6 0.1 - 1.3 K/UL    Absolute Eos # 0.1 0.0 - 0.8 K/UL    Basophils Absolute 0.0 0.0 - 0.2 K/UL    Absolute Immature Granulocyte 0.0 0.0 - 0.5 K/UL   Comprehensive Metabolic Panel    Collection Time: 02/28/23 11:36 AM   Result Value Ref Range    Sodium 139 133 - 143 mmol/L    Potassium 3.9 3.5 - 5.1 mmol/L    Chloride 104 101 - 110 mmol/L    CO2 29 21 - 32 mmol/L    Anion Gap 6 2 - 11 mmol/L    Glucose 110 (H) 65 - 100 mg/dL    BUN 13 8 - 23 MG/DL    Creatinine 0.72 0.6 - 1.0 MG/DL    Est, Glom Filt Rate >60 >60 ml/min/1.73m2    Calcium 9.5 8.3 - 10.4 MG/DL    Total Bilirubin 1.3 (H) 0.2 - 1.1 MG/DL    ALT 23 12 - 65 U/L    AST 17 15 - 37 U/L    Alk Phosphatase 116 50 - 136 U/L    Total Protein 8.1 6.3 - 8.2 g/dL    Albumin 4.1 3.2 - 4.6 g/dL    Globulin 4.0 2.8 - 4.5 g/dL    Albumin/Globulin Ratio 1.0 0.4 - 1.6     Troponin    Collection Time: 02/28/23 11:36 AM   Result Value Ref Range    Troponin, High Sensitivity 8.0 0 - 14 pg/mL   POCT Glucose    Collection Time: 02/28/23 12:10 PM   Result Value Ref Range    POC Glucose 108 (H) 65 - 100 mg/dL    Performed by: [email or web address]    EKG 12 Lead    Collection Time: 02/28/23  1:27 PM   Result Value Ref Range    Ventricular Rate 78 BPM    Atrial Rate 78 BPM    P-R Interval 193 ms    QRS Duration 100 ms    Q-T Interval 418 ms    QTc Calculation (Bazett) 477 ms    P Axis 57 degrees    R Axis -15 degrees    T Axis 26 degrees    Diagnosis Sinus rhythm        I have personally reviewed imaging studies:  CT HEAD WO CONTRAST    Result Date:  2/28/2023  EXAMINATION: CT HEAD WO CONTRAST 2/28/2023 12:25 PM ACCESSION NUMBER: [id number] COMPARISON: None available INDICATION: Right facial numbness and dizziness TECHNIQUE: Multiple-row detector helical CT examination of the head without intravenous contrast. Radiation dose reduction techniques were used for this study. Our CT scanners use one or all of the following: Automated exposure control, adjustment of the mA and/or kV according to patient size, iterative reconstruction. FINDINGS: There is no midline shift or mass lesion. There is no evidence of intracranial hemorrhage or acute infarct. No fractures are evident. Small small-volume fluid/secretions in the sphenoid sinuses. The visualized portions of the paranasal sinuses are otherwise pneumatized. -No acute intracranial abnormality. -Small-volume fluid within the sphenoid sinuses. Echocardiogram:  No results found for this or any previous visit.         Orders Placed This Encounter   Medications    albuterol sulfate HFA (PROVENTIL;VENTOLIN;PROAIR) 108 (90 Base) MCG/ACT inhaler 2 puff     Order Specific Question:   Initiate RT Bronchodilator Protocol     Answer:   Yes - Inpatient Protocol    mometasone-formoterol (DULERA) 200-5 MCG/ACT inhaler 2 puff    busPIRone (BUSPAR) tablet 10 mg    montelukast (SINGULAIR) tablet 10 mg    OR Linked Order Group     ondansetron (ZOFRAN-ODT) disintegrating tablet 4 mg     ondansetron (ZOFRAN) injection 4 mg    polyethylene glycol (GLYCOLAX) packet 17 g    enoxaparin (LOVENOX) injection 40 mg     Order Specific Question:   Indication of Use     Answer:   Prophylaxis-DVT/PE    OR Linked Order Group     aspirin EC tablet 81 mg     aspirin suppository 300 mg    atorvastatin (LIPITOR) tablet 80 mg    LORazepam (ATIVAN) tablet 1 mg         Signed:  APPLE Miller - CNP    Notes, labs, VS, diagnostic testing reviewed  Case discussed with pt, care team ,Dr. Pennie Nguyễn, friend at bedside

## 2023-02-28 NOTE — ED PROVIDER NOTES
Emergency Department Provider Note                   PCP:                LEONA Griggs               Age: 74 y.o. Sex: female       ICD-10-CM    1. TIA (transient ischemic attack)  G45.9       2. Paroxysmal atrial fibrillation (HCC)  I48.0       3. Acute chest pain  R07.9       4. Dizziness  R42 Transthoracic echocardiogram (TTE) complete with contrast, bubble, strain, and 3D PRN     Transthoracic echocardiogram (TTE) complete with contrast, bubble, strain, and 3D PRN          DISPOSITION Admitted 02/28/2023 01:42:23 PM       MEDICAL DECISION MAKING  Complexity of Problems Addressed:  1 acute illness that poses a threat to life or bodily function. Concern for stroke or TIA. Especially given that patient Has issues with A. fibrillation and not anticoagulted. Also, with chest pain. Check EKG and troponins. Doubt pulmonary embolism. Do not believe CT angiogram of chest needed although I considered it earlier. Data Reviewed and Analyzed:  Category 1:   I reviewed records from an external source: ED records from outside this hospital.  I reviewed records from an external source: previous old EKG's reviewed. KG from 2 weeks ago with normal sinus rhythm with occasional PAC. No atrial fibrillation. Ophthalmology office visit yesterday without any evidence for arterial embolism. I ordered each unique test.  I reviewed the results of each unique test.    The patients assessment required an independent historian: As patient was confused on timeline of events, probably due to some dementia, but possibly due to stroke symptoms    Category 2:   ED EKG was independently interpreted in the absence of a cardiologist.  Rate: 114  EKG Interpretation: EKG Interpretation: atrial fibrillation  ST Segments: Nonspecific ST segments - NO STEMI  I independently ordered and reviewed the EKG. I reviewed notes from a unique source regarding EKG that was done on February 13.  At that time it was normal sinus rhythm with PACs.    Category 3: Discussion of management or test interpretation. Lab work essentially normal.  I reviewed CT scan. No obvious bleeding. Risk of Complications and/or Morbidity of Patient Management:      Discussion with neurologist.  Patient not tPA candidate. Symptoms resolved. Not interventional candidate. Will discuss with hospitalist regarding admission and stroke work-up. Amie Neumann is a 74 y.o. female who presents to the Emergency Department with chief complaint of    Chief Complaint   Patient presents with    Numbness      Some history obtained from the friend as the patient appears confused, and cannot construct a good timeline of events. Aretta Fruits is independent source. Patient states headache that started at 4:30 yesterday afternoon. She also noticed some pain in her right arm. Unsure of chest pain. She awoke with some numbness and tingling to her left face today. Unsure if chest pain radiating to the right arm or that was a second separate pain. No focal numbness or weakness of arms and legs and no shortness of breath. Has history of hysterectomy in the past. No history of stroke. Patient also has issues with palpitations and wore a Holter monitor for one week. She was called a few days ago and told she had now started with atrial fibrillation and wants to see a cardiologist. Denies minimal palpitations at this point. Not on any blood thinners. States checked her blood pressure at home and has been up and down. The history is provided by the patient and a friend. Review of Systems   Constitutional:  Negative for chills and fever. Eyes:  Negative for visual disturbance. Respiratory:  Negative for cough and shortness of breath. Cardiovascular:  Positive for chest pain and palpitations. Gastrointestinal:  Negative for abdominal pain, diarrhea, nausea and vomiting. Neurological:  Positive for dizziness and numbness.  Negative for speech difficulty, weakness, light-headedness and headaches. Vitals signs and nursing note reviewed. Patient Vitals for the past 4 hrs:   Temp Pulse Resp BP SpO2   02/28/23 1935 97.7 °F (36.5 °C) 87 16 106/70 94 %          Physical Exam  Vitals and nursing note reviewed. Constitutional:       Appearance: She is not ill-appearing. HENT:      Head: Normocephalic and atraumatic. Right Ear: External ear normal.      Left Ear: External ear normal.      Mouth/Throat:      Mouth: Mucous membranes are moist.      Pharynx: Oropharynx is clear. Eyes:      General: No scleral icterus. Extraocular Movements: Extraocular movements intact. Pupils: Pupils are equal, round, and reactive to light. Cardiovascular:      Rate and Rhythm: Normal rate and regular rhythm. Pulmonary:      Effort: Pulmonary effort is normal.      Breath sounds: Normal breath sounds. Abdominal:      Palpations: Abdomen is soft. Tenderness: There is no abdominal tenderness. Musculoskeletal:         General: No swelling or tenderness. Cervical back: Normal range of motion and neck supple. Right lower leg: No edema. Left lower leg: No edema. Skin:     General: Skin is warm and dry. Neurological:      General: No focal deficit present. Mental Status: She is alert. Comments: Oriented times three. No drift. Normal finger nose testing. No asymmetry. No focal decreased sensation to face or arm on either side. Procedures          Orders Placed This Encounter   Procedures    CT HEAD WO CONTRAST    MRI BRAIN WO CONTRAST    CBC with Auto Differential    Comprehensive Metabolic Panel    Troponin    CBC    Hemoglobin A1c    Lipid Panel    ADULT DIET; Regular    NIHSS    Nursing swallow assessment    Vital signs    Up as tolerated    Adv Diet as Tolerated (nurse communication)    NIHSS/Neuro Checks    Tobacco cessation education    Swallow screen by nursing before diet and oral medications started.     Stroke education    Telemetry monitoring - 48 hour duration    Notify Physician    Full code    Inpatient consult to Cardiology    OT eval and treat    PT evaluation and treat    Initiate Oxygen Therapy Protocol    Speech Language Pathology (SLP) eval and treat    POCT Glucose    POCT Glucose    EKG 12 Lead    EKG 12 Lead    Place in Observation Service        Medications   albuterol sulfate HFA (PROVENTIL;VENTOLIN;PROAIR) 108 (90 Base) MCG/ACT inhaler 2 puff (has no administration in time range)   mometasone-formoterol (DULERA) 200-5 MCG/ACT inhaler 2 puff (0 puffs Inhalation Held 2/28/23 2105)   busPIRone (BUSPAR) tablet 10 mg (has no administration in time range)   montelukast (SINGULAIR) tablet 10 mg (has no administration in time range)   ondansetron (ZOFRAN-ODT) disintegrating tablet 4 mg (has no administration in time range)     Or   ondansetron (ZOFRAN) injection 4 mg (has no administration in time range)   polyethylene glycol (GLYCOLAX) packet 17 g (has no administration in time range)   aspirin EC tablet 81 mg (has no administration in time range)     Or   aspirin suppository 300 mg (has no administration in time range)   atorvastatin (LIPITOR) tablet 80 mg (has no administration in time range)   LORazepam (ATIVAN) tablet 1 mg (has no administration in time range)   apixaban (ELIQUIS) tablet 5 mg (has no administration in time range)       Current Discharge Medication List           Past Medical History:   Diagnosis Date    Allergic rhinitis     Anxiety     Arthritis     Asthma     pt uses inhaler daily    Breast cancer ([facility] [address] 1999    Lt Lumpectomy    Bronchitis     pt taking levaquin; started medication on 1/11/13    Cancer ([address] 1999    left breast; s/p left breast lumpectomy with chemo & radiation    Chronic sinusitis     Deviated nasal septum     GERD (gastroesophageal reflux disease)     controlled with medication    History of breast cancer in female 1999    left breast    Hypercholesterolemia     no meds    Hyperlipidemia     no meds Hypertrophy of nasal turbinates     Malignant neoplasm of upper-outer quadrant of left breast in female, estrogen receptor positive (HCC) 2/13/2018    Nasal congestion     mucus/chest congestion/cough    Nasal obstruction     Nasal polyp     Psychiatric disorder     anxiety    Sensory neuropathy 7/25/2018    Thyroid nodule 12/7/2012    Thyroid nodule     Unspecified adverse effect of anesthesia     \"slow to wake up\"    Unspecified sleep apnea     \"possible\" per pt; does not use CPAP    Vitamin D deficiency     Weight gain 12/7/2012    Weight gain     30 lbs within last 6 years         Past Surgical History:   Procedure Laterality Date    BREAST LUMPECTOMY Left 1999    Lt Lumpectomy    CATARACT REMOVAL      bilat eyes    HYSTERECTOMY (CERVIX STATUS UNKNOWN)  1993    SEPTOPLASTY  01/21/2013    Septo / Leighton Chucho / Therese Darren / nasal polypectomy     SINUS SURGERY PROC UNLISTED  1983    deviated septum surgery        Family History   Problem Relation Age of Onset    Heart Disease Father     Diabetes Father         Type II    Stroke Father     Panic Disorder Maternal Aunt     Cancer Paternal Aunt     Diabetes Maternal Grandfather     Osteoporosis Paternal Grandmother     Diabetes Paternal Grandfather     Asthma Child     Migraines Child     Other Mother         TBI 2012    Malig Hypertherm Neg Hx     Pseudochol.  Deficiency Neg Hx     Delayed Awakening Neg Hx     Post-op Nausea/Vomiting Neg Hx     Emergence Delirium Neg Hx     Post-op Cognitive Dysfunction Neg Hx     Breast Cancer Neg Hx         Social History     Socioeconomic History    Marital status: Single     Spouse name: None    Number of children: None    Years of education: None    Highest education level: None   Tobacco Use    Smoking status: Never    Smokeless tobacco: Never   Substance and Sexual Activity    Alcohol use: No    Drug use: No        Allergies: Penicillins, Sulfa antibiotics, Codeine, Ciprofloxacin, Prednisone, and Triamcinolone acetonide    Current Discharge Medication List        CONTINUE these medications which have NOT CHANGED    Details   ondansetron (ZOFRAN) 4 MG tablet Take 1 tablet by mouth 3 times daily as needed for Nausea or Vomiting  Qty: 15 tablet, Refills: 2      albuterol sulfate  (90 Base) MCG/ACT inhaler Inhale 2 puffs into the lungs every 6 hours as needed      ascorbic acid (VITAMIN C) 500 MG tablet Take by mouth      budesonide-formoterol (SYMBICORT) 160-4.5 MCG/ACT AERO INHALE 2 PUFFS TWICE A DAY      busPIRone (BUSPAR) 10 MG tablet TAKE 1 TABLET BY MOUTH THREE (3) TIMES DAILY as needed. vitamin D3 (CHOLECALCIFEROL) 125 MCG (5000 UT) TABS tablet Take 5,000 Units by mouth daily      fluticasone (FLONASE) 50 MCG/ACT nasal spray SPRAY 1 SPRAY INTO EACH NOSTRIL EVERY DAY      loratadine (CLARITIN) 10 MG tablet 1 TABLET ONCE A DAY AM ORALLY 30 DAY(S)      miconazole (MICOTIN) 2 % powder Apply topically 3 times daily      montelukast (SINGULAIR) 10 MG tablet Take 10 mg by mouth daily      mupirocin (BACTROBAN) 2 % ointment APPLY TO AFFECTED AREA TWICE A DAY              Results for orders placed or performed during the hospital encounter of 02/28/23   CT HEAD WO CONTRAST    Narrative    EXAMINATION: CT HEAD WO CONTRAST 2/28/2023 12:25 PM    ACCESSION NUMBER: [id number]    COMPARISON: None available    INDICATION: Right facial numbness and dizziness    TECHNIQUE: Multiple-row detector helical CT examination of the head without  intravenous contrast.     Radiation dose reduction techniques were used for this study. Our CT scanners  use one or all of the following: Automated exposure control, adjustment of the  mA and/or kV according to patient size, iterative reconstruction. FINDINGS:  There is no midline shift or mass lesion. There is no evidence of intracranial  hemorrhage or acute infarct. No fractures are evident. Small small-volume fluid/secretions in the sphenoid  sinuses.  The visualized portions of the paranasal sinuses are otherwise  pneumatized. Impression    -No acute intracranial abnormality.  -Small-volume fluid within the sphenoid sinuses.        CBC with Auto Differential   Result Value Ref Range    WBC 8.3 4.3 - 11.1 K/uL    RBC 5.05 4.05 - 5.2 M/uL    Hemoglobin 15.9 (H) 11.7 - 15.4 g/dL    Hematocrit 46.9 (H) 35.8 - 46.3 %    MCV 92.9 82.0 - 102.0 FL    MCH 31.5 26.1 - 32.9 PG    MCHC 33.9 31.4 - 35.0 g/dL    RDW 12.1 11.9 - 14.6 %    Platelets 347 393 - 548 K/uL    MPV 9.9 9.4 - 12.3 FL    nRBC 0.00 0.0 - 0.2 K/uL    Differential Type AUTOMATED      Seg Neutrophils 71 43 - 78 %    Lymphocytes 20 13 - 44 %    Monocytes 8 4.0 - 12.0 %    Eosinophils % 1 0.5 - 7.8 %    Basophils 1 0.0 - 2.0 %    Immature Granulocytes 0 0.0 - 5.0 %    Segs Absolute 5.9 1.7 - 8.2 K/UL    Absolute Lymph # 1.6 0.5 - 4.6 K/UL    Absolute Mono # 0.6 0.1 - 1.3 K/UL    Absolute Eos # 0.1 0.0 - 0.8 K/UL    Basophils Absolute 0.0 0.0 - 0.2 K/UL    Absolute Immature Granulocyte 0.0 0.0 - 0.5 K/UL   Comprehensive Metabolic Panel   Result Value Ref Range    Sodium 139 133 - 143 mmol/L    Potassium 3.9 3.5 - 5.1 mmol/L    Chloride 104 101 - 110 mmol/L    CO2 29 21 - 32 mmol/L    Anion Gap 6 2 - 11 mmol/L    Glucose 110 (H) 65 - 100 mg/dL    BUN 13 8 - 23 MG/DL    Creatinine 0.72 0.6 - 1.0 MG/DL    Est, Glom Filt Rate >60 >60 ml/min/1.73m2    Calcium 9.5 8.3 - 10.4 MG/DL    Total Bilirubin 1.3 (H) 0.2 - 1.1 MG/DL    ALT 23 12 - 65 U/L    AST 17 15 - 37 U/L    Alk Phosphatase 116 50 - 136 U/L    Total Protein 8.1 6.3 - 8.2 g/dL    Albumin 4.1 3.2 - 4.6 g/dL    Globulin 4.0 2.8 - 4.5 g/dL    Albumin/Globulin Ratio 1.0 0.4 - 1.6     Troponin   Result Value Ref Range    Troponin, High Sensitivity 8.0 0 - 14 pg/mL   POCT Glucose   Result Value Ref Range    POC Glucose 108 (H) 65 - 100 mg/dL    Performed by: James    EKG 12 Lead   Result Value Ref Range    Ventricular Rate 114 BPM    Atrial Rate 138 BPM    QRS Duration 84 ms    Q-T Interval 344 ms    QTc Calculation (Bazett) 474 ms    R Axis 47 degrees    T Axis 10 degrees    Diagnosis       Atrial fibrillation with rapid ventricular response   EKG 12 Lead   Result Value Ref Range    Ventricular Rate 78 BPM    Atrial Rate 78 BPM    P-R Interval 193 ms    QRS Duration 100 ms    Q-T Interval 418 ms    QTc Calculation (Bazett) 477 ms    P Axis 57 degrees    R Axis -15 degrees    T Axis 26 degrees    Diagnosis Sinus rhythm         CT HEAD WO CONTRAST   Final Result   -No acute intracranial abnormality.   -Small-volume fluid within the sphenoid sinuses. MRI BRAIN WO CONTRAST    (Results Pending)        NIH Stroke Scale  Interval: Reassessment  Level of Consciousness (1a): Alert  LOC Questions (1b): Answers both correctly  LOC Commands (1c): Performs both tasks correctly  Best Gaze (2): Normal  Visual (3): No visual loss  Facial Palsy (4): Normal symmetrical movement  Motor Arm, Left (5a): No drift  Motor Arm, Right (5b): No drift  Motor Leg, Left (6a): No drift  Motor Leg, Right (6b): No drift  Limb Ataxia (7): Absent  Sensory (8): Normal  Best Language (9): No aphasia  Dysarthria (10): Normal  Extinction and Inattention (11): No abnormality  Total: 0       10:03 PM  Patient appears to have converted to normal sinus rhythm with PACs. Obtain repeat EKG. Repeat, EKG does indeed show conversion to normal sinus rhythm, but has PACs. Discussed with Tele, neurologist, and TPA, nor intervention indicated since symptoms have resolved. He does suggest admission for stroke work up. . I did discuss with hospital score admission as well. Voice dictation software was used during the making of this note. This software is not perfect and grammatical and other typographical errors may be present. This note has not been completely proofread for errors.      Fidencio Khan MD  02/28/23 8620

## 2023-02-28 NOTE — ED TRIAGE NOTES
Patient ambulatory to triage. Patient state she had an episode of nausea, dizziness and heart palpation. Patient states this began a 8:30 last night and ended at 10:30. Patient states sh was sent to the ER for a CT scan.

## 2023-03-01 ENCOUNTER — APPOINTMENT (OUTPATIENT)
Dept: MRI IMAGING | Age: 73
DRG: 069 | End: 2023-03-01
Payer: MEDICARE

## 2023-03-01 ENCOUNTER — APPOINTMENT (OUTPATIENT)
Dept: NON INVASIVE DIAGNOSTICS | Age: 73
DRG: 069 | End: 2023-03-01
Payer: MEDICARE

## 2023-03-01 LAB
CHOLEST SERPL-MCNC: 241 MG/DL
ECHO AO ASC DIAM: 3.2 CM
ECHO AO ROOT DIAM: 2.8 CM
ECHO AV AREA PEAK VELOCITY: 2.1 CM2
ECHO AV AREA VTI: 2.1 CM2
ECHO AV MEAN GRADIENT: 5 MMHG
ECHO AV MEAN VELOCITY: 1.1 M/S
ECHO AV PEAK GRADIENT: 8 MMHG
ECHO AV PEAK VELOCITY: 1.4 M/S
ECHO AV VELOCITY RATIO: 0.64
ECHO AV VTI: 24.9 CM
ECHO EST RA PRESSURE: 5 MMHG
ECHO LA AREA 2C: 15.7 CM2
ECHO LA AREA 4C: 16.7 CM2
ECHO LA DIAMETER: 3.3 CM
ECHO LA MAJOR AXIS: 4.7 CM
ECHO LA MINOR AXIS: 5.1 CM
ECHO LA TO AORTIC ROOT RATIO: 1.18
ECHO LA VOL 2C: 40 ML (ref 22–52)
ECHO LA VOL 4C: 48 ML (ref 22–52)
ECHO LA VOL BP: 46 ML (ref 22–52)
ECHO LV E' LATERAL VELOCITY: 9 CM/S
ECHO LV E' SEPTAL VELOCITY: 6 CM/S
ECHO LV EDV A2C: 84 ML
ECHO LV EDV A4C: 94 ML
ECHO LV EJECTION FRACTION A2C: 59 %
ECHO LV EJECTION FRACTION A4C: 58 %
ECHO LV EJECTION FRACTION BIPLANE: 59 % (ref 55–100)
ECHO LV ESV A2C: 35 ML
ECHO LV ESV A4C: 40 ML
ECHO LV FRACTIONAL SHORTENING: 35 % (ref 28–44)
ECHO LV INTERNAL DIMENSION DIASTOLIC: 4.3 CM (ref 3.9–5.3)
ECHO LV INTERNAL DIMENSION SYSTOLIC: 2.8 CM
ECHO LV IVSD: 1 CM (ref 0.6–0.9)
ECHO LV MASS 2D: 132.7 G (ref 67–162)
ECHO LV POSTERIOR WALL DIASTOLIC: 0.9 CM (ref 0.6–0.9)
ECHO LV RELATIVE WALL THICKNESS RATIO: 0.42
ECHO LVOT AREA: 3.1 CM2
ECHO LVOT AV VTI INDEX: 0.66
ECHO LVOT DIAM: 2 CM
ECHO LVOT MEAN GRADIENT: 2 MMHG
ECHO LVOT PEAK GRADIENT: 3 MMHG
ECHO LVOT PEAK VELOCITY: 0.9 M/S
ECHO LVOT SV: 51.8 ML
ECHO LVOT VTI: 16.5 CM
ECHO MV A VELOCITY: 0.87 M/S
ECHO MV AREA VTI: 2.4 CM2
ECHO MV E DECELERATION TIME (DT): 182 MS
ECHO MV E VELOCITY: 0.54 M/S
ECHO MV E/A RATIO: 0.62
ECHO MV E/E' LATERAL: 6
ECHO MV E/E' RATIO (AVERAGED): 7.5
ECHO MV E/E' SEPTAL: 9
ECHO MV LVOT VTI INDEX: 1.33
ECHO MV MAX VELOCITY: 0.9 M/S
ECHO MV MEAN GRADIENT: 1 MMHG
ECHO MV MEAN VELOCITY: 0.6 M/S
ECHO MV PEAK GRADIENT: 3 MMHG
ECHO MV VTI: 22 CM
ECHO PV ACCELERATION TIME (AT): 111 MS
ECHO PV MAX VELOCITY: 0.9 M/S
ECHO PV PEAK GRADIENT: 3 MMHG
ECHO RIGHT VENTRICULAR SYSTOLIC PRESSURE (RVSP): 31 MMHG
ECHO RV BASAL DIMENSION: 3.1 CM
ECHO RV FREE WALL PEAK S': 15 CM/S
ECHO RV TAPSE: 2.2 CM (ref 1.7–?)
ECHO TV REGURGITANT MAX VELOCITY: 2.56 M/S
ECHO TV REGURGITANT PEAK GRADIENT: 26 MMHG
ERYTHROCYTE [DISTWIDTH] IN BLOOD BY AUTOMATED COUNT: 12.1 % (ref 11.9–14.6)
EST. AVERAGE GLUCOSE BLD GHB EST-MCNC: 111 MG/DL
HBA1C MFR BLD: 5.5 % (ref 4.8–5.6)
HCT VFR BLD AUTO: 40.8 % (ref 35.8–46.3)
HDLC SERPL-MCNC: 59 MG/DL (ref 40–60)
HDLC SERPL: 4.1
HGB BLD-MCNC: 14.1 G/DL (ref 11.7–15.4)
LDLC SERPL CALC-MCNC: 169 MG/DL
LV EF: 58 %
LVEF MODALITY: ABNORMAL
MCH RBC QN AUTO: 31.5 PG (ref 26.1–32.9)
MCHC RBC AUTO-ENTMCNC: 34.6 G/DL (ref 31.4–35)
MCV RBC AUTO: 91.3 FL (ref 82–102)
NRBC # BLD: 0 K/UL (ref 0–0.2)
PLATELET # BLD AUTO: 217 K/UL (ref 150–450)
PMV BLD AUTO: 9.8 FL (ref 9.4–12.3)
RBC # BLD AUTO: 4.47 M/UL (ref 4.05–5.2)
TRIGL SERPL-MCNC: 65 MG/DL (ref 35–150)
VLDLC SERPL CALC-MCNC: 13 MG/DL (ref 6–23)
WBC # BLD AUTO: 6 K/UL (ref 4.3–11.1)

## 2023-03-01 PROCEDURE — 36415 COLL VENOUS BLD VENIPUNCTURE: CPT

## 2023-03-01 PROCEDURE — 97161 PT EVAL LOW COMPLEX 20 MIN: CPT

## 2023-03-01 PROCEDURE — 97535 SELF CARE MNGMENT TRAINING: CPT

## 2023-03-01 PROCEDURE — 70551 MRI BRAIN STEM W/O DYE: CPT

## 2023-03-01 PROCEDURE — 92610 EVALUATE SWALLOWING FUNCTION: CPT

## 2023-03-01 PROCEDURE — 99223 1ST HOSP IP/OBS HIGH 75: CPT | Performed by: INTERNAL MEDICINE

## 2023-03-01 PROCEDURE — 85027 COMPLETE CBC AUTOMATED: CPT

## 2023-03-01 PROCEDURE — 83036 HEMOGLOBIN GLYCOSYLATED A1C: CPT

## 2023-03-01 PROCEDURE — 97165 OT EVAL LOW COMPLEX 30 MIN: CPT

## 2023-03-01 PROCEDURE — 6370000000 HC RX 637 (ALT 250 FOR IP): Performed by: NURSE PRACTITIONER

## 2023-03-01 PROCEDURE — 6370000000 HC RX 637 (ALT 250 FOR IP): Performed by: INTERNAL MEDICINE

## 2023-03-01 PROCEDURE — 96374 THER/PROPH/DIAG INJ IV PUSH: CPT

## 2023-03-01 PROCEDURE — 93306 TTE W/DOPPLER COMPLETE: CPT

## 2023-03-01 PROCEDURE — G0378 HOSPITAL OBSERVATION PER HR: HCPCS

## 2023-03-01 PROCEDURE — 97530 THERAPEUTIC ACTIVITIES: CPT

## 2023-03-01 PROCEDURE — 93306 TTE W/DOPPLER COMPLETE: CPT | Performed by: INTERNAL MEDICINE

## 2023-03-01 PROCEDURE — 6360000002 HC RX W HCPCS: Performed by: NURSE PRACTITIONER

## 2023-03-01 PROCEDURE — 80061 LIPID PANEL: CPT

## 2023-03-01 RX ORDER — METOPROLOL SUCCINATE 25 MG/1
25 TABLET, EXTENDED RELEASE ORAL EVERY 24 HOURS
Status: DISCONTINUED | OUTPATIENT
Start: 2023-03-01 | End: 2023-03-02 | Stop reason: HOSPADM

## 2023-03-01 RX ORDER — LORAZEPAM 1 MG/1
1 TABLET ORAL ONCE
Status: COMPLETED | OUTPATIENT
Start: 2023-03-01 | End: 2023-03-01

## 2023-03-01 RX ADMIN — APIXABAN 5 MG: 5 TABLET, FILM COATED ORAL at 09:17

## 2023-03-01 RX ADMIN — APIXABAN 5 MG: 5 TABLET, FILM COATED ORAL at 21:22

## 2023-03-01 RX ADMIN — ONDANSETRON 4 MG: 2 INJECTION INTRAMUSCULAR; INTRAVENOUS at 19:54

## 2023-03-01 RX ADMIN — LORAZEPAM 1 MG: 1 TABLET ORAL at 10:15

## 2023-03-01 RX ADMIN — ATORVASTATIN CALCIUM 80 MG: 40 TABLET, FILM COATED ORAL at 21:23

## 2023-03-01 RX ADMIN — ASPIRIN 81 MG: 81 TABLET, COATED ORAL at 09:16

## 2023-03-01 RX ADMIN — METOPROLOL SUCCINATE 25 MG: 25 TABLET, FILM COATED, EXTENDED RELEASE ORAL at 18:18

## 2023-03-01 ASSESSMENT — ENCOUNTER SYMPTOMS
HOARSE VOICE: 0
WHEEZING: 0
DOUBLE VISION: 0
HEMATOCHEZIA: 0
ABDOMINAL PAIN: 0
HEMOPTYSIS: 0
HEMATEMESIS: 0
EYE REDNESS: 0
STRIDOR: 0

## 2023-03-01 ASSESSMENT — PAIN SCALES - GENERAL: PAINLEVEL_OUTOF10: 0

## 2023-03-01 NOTE — DISCHARGE INSTRUCTIONS
Stroke: After Your Visit    Your Care Instructions    You have had a stroke.  Risk factors for stroke include being overweight, smoking, and sedentary lifestyle. This means that the blood flow to a part of your brain was blocked for some time, which damages the nerve cells in that part of the brain. The part of your body controlled by that part of your brain may not function properly now.    The brain is an amazing organ that can heal itself to some degree. The stroke you had damaged part of your brain, but other parts of your brain may take over in some way for the damaged areas. You have already started this process.    Going home may be hard for you and your family. The more you can try to do for yourself, the better. Remember to take each day one at a time.    Follow-up care is a key part of your treatment and safety. Be sure to make and go to all appointments, and call your doctor if you are having problems. It's also a good idea to know your test results and keep a list of the medicines you take.    How can you care for yourself at home?   Enter a stroke rehabilitation (rehab) program, if your doctor recommends it. Physical, speech, and occupational therapies can help you manage bathing, dressing, eating, and other basics of daily living.  Eat a heart-healthy diet that is low in cholesterol, saturated fat, and salt. Eat lots of fresh fruits and vegetables and foods high in fiber.  Increase your activities slowly. Take short rest breaks when you get tired. Gradually increase the amount you walk. Start out by walking a little more than you did the day before.  Do not drive until your doctor says it is okay.  It is normal to feel sad or depressed after a stroke. If the “blues” last, talk to your doctor.  If you are having problems with urine leakage, go to the bathroom at regular times, including when you first wake up and at bedtime. Also, limit fluids after dinner.  If you are constipated, drink plenty of  fluids, enough so that your urine is light yellow or clear like water. If you have kidney, heart, or liver disease and have to limit fluids, talk with your doctor before you increase the amount of fluids you drink. Set up a regular time for using the toilet. If you continue to have constipation, your doctor may suggest using a bulking agent, such as Metamucil, or a stool softener, laxative, or enema. Medicines  Take your medicines exactly as prescribed. Call your doctor if you think you are having a problem with your medicine. You may be taking several medicines. ACE (angiotensin-converting enzyme) inhibitors, angiotensin II receptor blockers (ARBs), beta-blockers, diuretics (water pills), and calcium channel blockers control your blood pressure. Statins help lower cholesterol. Your doctor may also prescribe medicines for depression, pain, sleep problems, anxiety, or agitation. If your doctor has given you medicine that prevents blood clots, such as warfarin (Coumadin), aspirin combined with extended-release dipyridamole (Aggrenox), clopidogrel (Plavix), or aspirin to prevent another stroke, you should:  Tell your dentist, pharmacist, and other health professionals that you take these medicines. Watch for unusual bruising or bleeding, such as blood in your urine, red or black stools, or bleeding from your nose or gums. Get regular blood tests to check your clotting time if you are taking Coumadin. Wear medical alert jewelry that says you take blood thinners. You can buy this at most drugstores. Do not take any over-the-counter medicines or herbal products without talking to your doctor first.  If you take birth control pills or hormone replacement therapy, talk to your doctor about whether they are right for you. For family members and caregivers  Make the home safe. Set up a room so that your loved one does not have to climb stairs. Be sure the bathroom is on the same floor.  Move throw rugs and furniture that could cause falls, and make sure that the lighting is good. Put grab bars and seats in tubs and showers.  Find out what your loved one can do and what he or she needs help with. Try not to do things for your loved one that your loved one can do on his or her own. Help him or her learn and practice new skills.  Visit and talk with your loved one often. Try doing activities together that you both enjoy, such as playing cards or board games. Keep in touch with your loved one's friends as much as you can, and encourage them to visit.  Take care of yourself. Do not try to do everything yourself. Ask other family members to help. Eat well, get enough rest, and take time to do things that you enjoy. Keep up with your own doctor visits, and make sure to take your medicines regularly. Get out of the house as much as you can. Join a local support group. Find out if you qualify for home health care visits to help with rehab or for adult day care.    When should you call for help?    Call 911 anytime you think you may need emergency care. For example, call if:  You have signs of another stroke. These may include:  Sudden numbness, paralysis, or weakness in your face, arm, or leg, especially on only one side of your body.  New problems with walking or balance.  Sudden vision changes.  Drooling or slurred speech.  New problems speaking or understanding simple statements, or you feel confused.  A sudden, severe headache that is different from past headaches.  Call 911 even if these symptoms go away in a few minutes.  You cough up blood.  You vomit blood or what looks like coffee grounds.  You pass maroon or very bloody stools.    Call your doctor now or seek immediate medical care if:  You have new bruises or blood spots under your skin.  You have a nosebleed.  Your gums bleed when you brush your teeth.  You have blood in your urine.  Your stools are black and tarlike or have streaks of blood.  You have vaginal bleeding when  you are not having your period, or heavy period bleeding. You have new symptoms that may be related to your stroke, such as falls or trouble swallowing. Watch closely for changes in your health, and be sure to contact your doctor if you have any problems. Where can you learn more? Go to [employer]be    Enter C294  in the search box to learn more about \"Stroke: After Your Visit\". © 9545-4408 [employer]. Care instructions adapted under license by [facility] (which disclaims liability or warranty for this information). This care instruction is for use with your licensed healthcare professional. If you have questions about a medical condition or this instruction, always ask your healthcare professional. Darryle Halls any warranty or liability for your use of this information. DISCHARGE SUMMARY from Nurse    These are general instructions for a healthy lifestyle:    No smoking/ No tobacco products/ Avoid exposure to second hand smoke  Surgeon General's Warning:  Quitting smoking now greatly reduces serious risk to your health. Obesity, smoking, and sedentary lifestyle greatly increases your risk for illness    A healthy diet, regular physical exercise & weight monitoring are important for maintaining a healthy lifestyle    You may be retaining fluid if you have a history of heart failure or if you experience any of the following symptoms:  Weight gain of 3 pounds or more overnight or 5 pounds in a week, increased swelling in our hands or feet or shortness of breath while lying flat in bed. Please call your doctor as soon as you notice any of these symptoms; do not wait until your next office visit. The discharge information has been reviewed with the patient. The patient verbalized understanding.   Discharge medications reviewed with the patient and appropriate educational materials and side effects teaching were provided.   ___________________________________________________________________________________________________________________________________

## 2023-03-01 NOTE — PROGRESS NOTES
Advance Care Planning     Advance Care Planning Inpatient Note  [facility] Department    Today's Date: 3/1/2023  Unit: [facility]    Received request from patient and family. Upon review of chart and communication with care team, patient's decision making abilities are not in question. . Patient and Child/Children was/were present in the room during visit. Goals of ACP Conversation:  Discuss advance care planning documents    Health Care Decision Makers:     No healthcare decision makers have been documented. Click here to complete [address] including selection of the Healthcare Decision Maker Relationship (ie \"Primary\")  Summary:  Completed New Documents    Advance Care Planning Documents (Patient Wishes):  Healthcare Power of /Advance Directive Appointment of Health Care Agent     Assessment:  Assisted patient in completing HCPOA with daughter present. Copy placed on chart and original returned with additional copies. Interventions:  Assisted in the completion of documents according to patient's wishes at this time    Care Preferences Communicated:   No    Outcomes/Plan:  New advance directive completed.     Electronically signed by Kat Silva, [address] on 3/1/2023 at 2:11 PM

## 2023-03-01 NOTE — CARE COORDINATION
The patient was provided with coupons for Eliquis and an application for [address] [place] [address].

## 2023-03-01 NOTE — PROGRESS NOTES
SPEECH LANGUAGE PATHOLOGY: DYSPHAGIA    Initial Assessment and Discharge  OBSERVATION PATIENT    NAME: Lb Sánchez  : 1950  MRN: [id number]    ADMISSION DATE: 2023  PRIMARY DIAGNOSIS: Dizziness  TIA (transient ischemic attack) [G45.9]  Dizziness [R42]  Paroxysmal atrial fibrillation (HCC) [I48.0]  Acute chest pain [R07.9]    ICD-10: Treatment Diagnosis: R13.12 Dysphagia, Oropharyngeal Phase    RECOMMENDATIONS   Diet:  Diet Solids Recommendation: Regular  Liquid Consistency Recommendation: Thin    Medications: PO     Patient continues to require skilled intervention: No  D/C Recommendations: No follow up therapy recommended post discharge     ASSESSMENT    Dysphagia Diagnosis: Swallow function appears WFL  Dysphagia Impression : No issues identified. Patient presents with oral and pharyngeal phase of swallow within functional limits with no overt signs or symptoms of aspiration observed with any consistency assessed. Swallows of all textures timely, clear to cervical auscultation and with adequate laryngeal excursion. Voice clear after swallow. No oral residue observed. Conversational speech clear and appropriate. Recommend continue current regular texture diet and thin liquids. Give meds with water. No further skilled speech/swallow intervention currently indicated.     GENERAL    History of Present Injury/Illness: Ms. Irene Steve  has a past medical history of Allergic rhinitis, Anxiety, Arthritis, Asthma, Breast cancer ([address] Bronchitis, Cancer ([address] Chronic sinusitis, Deviated nasal septum, GERD (gastroesophageal reflux disease), History of breast cancer in female, Hypercholesterolemia, Hyperlipidemia, Hypertrophy of nasal turbinates, Malignant neoplasm of upper-outer quadrant of left breast in female, estrogen receptor positive ([address] Nasal congestion, Nasal obstruction, Nasal polyp, Psychiatric disorder, Sensory neuropathy, Thyroid nodule, Thyroid nodule, Unspecified adverse effect of anesthesia, Unspecified sleep apnea, Vitamin D deficiency, Weight gain, and Weight gain. . She also  has a past surgical history that includes Septoplasty (01/21/2013); Breast lumpectomy (Left, 1999); Cataract removal; sinus surgery proc unlisted (1983); and Hysterectomy (1993). Chart Reviewed: Yes  Subjective: Patient reports resolution of symptoms. Behavior/Cognition: Alert; Cooperative;Pleasant mood  Communication Observation: Functional  Follows Directions: Complex    Prior Dysphagia History: Denies     Current Diet : Regular  Current Liquid Diet : Thin    Respiratory Status: Room air              Pain:   Patient does not c/o pain                                        OBJECTIVE    Patient Positioning: Upright in bed   Oral Motor   Labial: No impairment  Dentition: Natural  Oral Hygiene: Moist;Clean  Lingual: No impairment  Velum: No Impairment  Mandible: No impairment  Dentition: Adequate        Baseline Vocal Quality: Normal    Oropharyngeal Phase:  Regular;Mixed Consistencies; Thin - straw; Thin - cup;Pureed  Assessment Method(s): Cervical auscultation;Observation;Palpation  Vocal Quality: No Impairment  Bolus Acceptance: No impairment  Bolus Formation/Control: No impairment  Propulsion: No impairment  Oral Residue: None  Initiation of Swallow: No impairment  Laryngeal Elevation: Functional  Aspiration Signs/Symptoms: None  Pharyngeal Phase Characteristics: No impairment, issues, or problems        Oral Phase - Comment: WNL  Pharyngeal Phase: WNL  PLAN    Duration/Frequency: No treatment indicated at this time    Dysphagia Outcome and Severity Scale (ANNALISA)  Dysphagia Outcome Severity Scale: Level 7: Normal in all situations  Interpretation of Tool: The Dysphagia Outcome and Severity Scale (ANNALISA) is a simple, easy-to-use, 7-point scale developed to systematically rate the functional severity of dysphagia based on objective assessment and make recommendations for diet level, independence level, and type of nutrition. Normal(7), Functional(6), Mild(5), Mild-Moderate(4), Moderate(3), Moderate-Severe(2), Severe(1)         Education: Patient  Patient Education: Role of SLP, results of assessment  Patient Education Response: Verbalizes understanding    PRECAUTIONS/ALLERGIES: Penicillins, Sulfa antibiotics, Codeine, Ciprofloxacin, Prednisone, and Triamcinolone acetonide   Safety Devices in place: Yes  Type of devices: Call light within reach        Therapy Time  SLP Individual Minutes  Time In: 4321  Time Out: 4603  Minutes: ETHAN Nielson  3/1/2023 1:40 PM

## 2023-03-01 NOTE — CARE COORDINATION
Discharge planning was discussed with the Interdisciplinary Team. Per the attending NP, the patient is a potential discharge home tomorrow on oral anticoagulants. She was evaluated by PT/OT and does not have any skilled therapy needs after she's been discharged. Case Management will continue to follow her for discharge planning needs.

## 2023-03-01 NOTE — PLAN OF CARE
Problem: Discharge Planning  Goal: Discharge to home or other facility with appropriate resources  3/1/2023 0130 by Jorge Torre RN  Outcome: Progressing  2/28/2023 1533 by Jorge Bailey RN  Outcome: Progressing     Problem: Safety - Adult  Goal: Free from fall injury  3/1/2023 0130 by Jorge Torre RN  Outcome: Progressing  2/28/2023 1533 by Jorge Bailey RN  Outcome: Progressing

## 2023-03-01 NOTE — H&P
[facility] Cardiology Initial Cardiac Evaluation      Date of  Admission: 2/28/2023 11:42 AM     Primary Care Physician: Christopher Diego  Primary Cardiologist: none  Referring Physician: Teodoro Perez NP  Supervising Physician: Dr Sinai Dudley    CC/Reason for evaluation: new onset afib    HPI:  Makayla Hayden is a 74 y.o. female with prior history of HLD, breast cancer, anxiety, asthma who presented to [facility] ED on 2/28 with complaint of nausea, dizziness, heart palpations and facial numbness. Reported symptoms started the night before and lasted ~2 hours. Patient was instructed to go to ED for CT head. -She has had symptoms of nausea vomiting and vertigo since January 15    In ED, /96 with HR 54. Labs showed WBC 8.3, H/H 15.9/46.9, Ptl 259, Na 139, K 3.5, BUN/Cr 13/0.72, trop 8.0. CT head with no acute intracranial abnormality. Neurology evaluated virtually in ER. Pt not TPA candidate. Symptoms have resolved. Pt found to be in afib. Has had a 2 week holter monitor on for similar complaints outpatient and was found to be in afib, pending Cardiology follow up. Patient was admitted with dizziness. MRI with no evidence of acute infarction- small remote left cerebellar infarction. ECHO showed preserved LVEF 55-60% and normal diastolic function. Patient was started on Eliquis and metoprolol tartrate. Cardiology consulted for new onset atrial fibrillation. Since she has been here in the hospital, she feels well. She said she had a Holter monitor done on an outpatient basis which showed atrial fibrillation. She is not always aware of her going out of rhythm into A-fib. She says she has no diagnosis of hypertension but when she first came into the ER, her blood pressures were elevated but she was also anxious.       Past Medical History:   Diagnosis Date    Allergic rhinitis     Anxiety     Arthritis     Asthma     pt uses inhaler daily    Breast cancer ([address] 1999    Lt Lumpectomy    Bronchitis     pt taking levaquin; started medication on 1/11/13    Cancer ([facility] 1999    left breast; s/p left breast lumpectomy with chemo & radiation    Chronic sinusitis     Deviated nasal septum     GERD (gastroesophageal reflux disease)     controlled with medication    History of breast cancer in female 1999    left breast    Hypercholesterolemia     no meds    Hyperlipidemia     no meds    Hypertrophy of nasal turbinates     Malignant neoplasm of upper-outer quadrant of left breast in female, estrogen receptor positive (HCC) 2/13/2018    Nasal congestion     mucus/chest congestion/cough    Nasal obstruction     Nasal polyp     Psychiatric disorder     anxiety    Sensory neuropathy 7/25/2018    Thyroid nodule 12/7/2012    Thyroid nodule     Unspecified adverse effect of anesthesia     \"slow to wake up\"    Unspecified sleep apnea     \"possible\" per pt; does not use CPAP    Vitamin D deficiency     Weight gain 12/7/2012    Weight gain     30 lbs within last 6 years       Past Surgical History:   Procedure Laterality Date    BREAST LUMPECTOMY Left 1999    Lt Lumpectomy    CATARACT REMOVAL      bilat eyes    HYSTERECTOMY (CERVIX STATUS UNKNOWN)  1993    SEPTOPLASTY  01/21/2013    Septo / Esperanza Corona / Mathieu Forbes / nasal polypectomy     SINUS SURGERY PROC UNLISTED  1983    deviated septum surgery       Allergies   Allergen Reactions    Penicillins Itching, Rash and Shortness Of Breath    Sulfa Antibiotics Anaphylaxis, Itching, Other (See Comments), Rash and Swelling     Edema (face, throat swelling)    Codeine Itching     Bilateral feet    Ciprofloxacin Nausea Only    Prednisone Itching     \"Racing heart\", \"like the top of my head was going to blow off\"    Triamcinolone Acetonide Other (See Comments)     Injection, noted itchiness, red face      Social History     Socioeconomic History    Marital status: Single     Spouse name: Not on file    Number of children: Not on file    Years of education: Not on file    Highest education level: Not on file   Occupational History    Not on file   Tobacco Use    Smoking status: Never    Smokeless tobacco: Never   Substance and Sexual Activity    Alcohol use: No    Drug use: No    Sexual activity: Not on file   Other Topics Concern    Not on file   Social History Narrative    Not on file     Social Determinants of Health     Financial Resource Strain: Not on file   Food Insecurity: Not on file   Transportation Needs: Not on file   Physical Activity: Not on file   Stress: Not on file   Social Connections: Not on file   Intimate Partner Violence: Not on file   Housing Stability: Not on file     Social History       Tobacco History       Smoking Status  Never      Smokeless Tobacco Use  Never              Alcohol History       Alcohol Use Status  No              Drug Use       Drug Use Status  No              Sexual Activity       Sexually Active  Not Asked                    Family History   Problem Relation Age of Onset    Heart Disease Father     Diabetes Father         Type II    Stroke Father     Panic Disorder Maternal Aunt     Cancer Paternal Aunt     Diabetes Maternal Grandfather     Osteoporosis Paternal Grandmother     Diabetes Paternal Grandfather     Asthma Child     Migraines Child     Other Mother         TBI 2012    Malig Hypertherm Neg Hx     Pseudochol.  Deficiency Neg Hx     Delayed Awakening Neg Hx     Post-op Nausea/Vomiting Neg Hx     Emergence Delirium Neg Hx     Post-op Cognitive Dysfunction Neg Hx     Breast Cancer Neg Hx         Current Facility-Administered Medications   Medication Dose Route Frequency    metoprolol tartrate (LOPRESSOR) tablet 25 mg  25 mg Oral BID    albuterol sulfate HFA (PROVENTIL;VENTOLIN;PROAIR) 108 (90 Base) MCG/ACT inhaler 2 puff  2 puff Inhalation Q6H PRN    busPIRone (BUSPAR) tablet 10 mg  10 mg Oral BID    montelukast (SINGULAIR) tablet 10 mg  10 mg Oral QHS    ondansetron (ZOFRAN-ODT) disintegrating tablet 4 mg  4 mg Oral Q8H PRN    Or    ondansetron (ZOFRAN) injection 4 mg  4 mg IntraVENous Q6H PRN    polyethylene glycol (GLYCOLAX) packet 17 g  17 g Oral Daily PRN    aspirin EC tablet 81 mg  81 mg Oral Daily    Or    aspirin suppository 300 mg  300 mg Rectal Daily    atorvastatin (LIPITOR) tablet 80 mg  80 mg Oral Nightly    apixaban (ELIQUIS) tablet 5 mg  5 mg Oral BID       Review of Symptoms:  Review of Systems   Constitutional: Negative for chills and fever. HENT:  Negative for ear discharge, hoarse voice and stridor. Eyes:  Negative for double vision and redness. Cardiovascular:  Negative for cyanosis and syncope. Respiratory:  Negative for hemoptysis and wheezing. Endocrine: Negative for polydipsia and polyphagia. Hematologic/Lymphatic: Negative for adenopathy. Skin:  Negative for itching and rash. Musculoskeletal:  Negative for joint swelling and muscle weakness. Gastrointestinal:  Negative for abdominal pain, hematemesis and hematochezia. Genitourinary:  Negative for flank pain and nocturia. Neurological:  Negative for focal weakness and seizures. Psychiatric/Behavioral:  Negative for altered mental status and suicidal ideas. Allergic/Immunologic: Negative for hives. Subjective:     Physical Exam:    Vitals:    03/01/23 0017 03/01/23 0258 03/01/23 0740 03/01/23 1146   BP: 115/82 (!) 142/92 118/66 117/75   Pulse: 78 78 87 (!) 105   Resp: 14 16 17 17   Temp: 97.5 °F (36.4 °C) 97.3 °F (36.3 °C) 98.1 °F (36.7 °C) 97.7 °F (36.5 °C)   TempSrc: Oral Oral Oral Oral   SpO2: 92% 97% 95% 98%       Physical Exam  General: Alert and awake and oriented in no acute distress   HEENT: Head is normocephalic and atraumatic, pupils are equal and bilaterally reactive to light, throat is clear  Neck: No significant jugular venous distention or carotid bruits. Cardiac: S1 and S2 heard, regular rate and rhythm, no significant murmurs rubs or gallops. Respiratory: Clear to auscultation bilaterally with no adventitious sounds.   Respirations are normal.  Abdomen: Soft, nontender, nondistended, bowel sounds present. No abdominal bruits noted. Extremities: No cyanosis clubbing or edema. Peripheral pulses:  Bilateral radial pulses are equal. Bilateral pedal pulses are well felt. Musculoskeletal: No significant joint swelling or redness noted. Lymphatic: No significant enlarged lymphadenopathy noted  Skin: No significant rashes noted in exposed regions. Neurological: No Facial droop, no gross focal motor or neurological deficits    Cardiographics    Telemetry: Normal sinus rhythm and no other significant findings  ECG: EKG from 2/28/2023 showed sinus rhythm with frequent PACs   Echocardiogram: 02/28/23    TRANSTHORACIC ECHOCARDIOGRAM (TTE) COMPLETE (CONTRAST/BUBBLE/3D PRN) 03/01/2023  9:26 AM (Final)    Interpretation Summary    Left Ventricle: Normal left ventricular systolic function with a visually estimated EF of 55 - 60%. Left ventricle size is normal. Normal wall thickness. Normal wall motion. Normal diastolic function. Aortic Valve: Mild sclerosis of the aortic valve cusp. Mitral Valve: Mild regurgitation. Tricuspid Valve: Mild regurgitation. Mildly elevated RVSP. The estimated RVSP is 31 mmHg. Interatrial Septum: Agitated saline study was negative with and without provocation. Technical qualifiers: Color flow Doppler was performed and pulse wave and/or continuous wave Doppler was performed.     Signed by: Casandra Dean MD on 3/1/2023  9:26 AM    Labs:     Recent Labs     03/01/23  0540 02/28/23  1136 02/13/23  1214   WBC 6.0 8.3 7.0   HGB 14.1 15.9* 14.9   HCT 40.8 46.9* 43.4   MCV 91.3 92.9 92.1    259 204     Lab Results   Component Value Date    WBC 6.0 03/01/2023    HGB 14.1 03/01/2023    HCT 40.8 03/01/2023     03/01/2023    CHOL 241 (H) 03/01/2023    TRIG 65 03/01/2023    HDL 59 03/01/2023    ALT 23 02/28/2023    AST 17 02/28/2023     02/28/2023    K 3.9 02/28/2023     02/28/2023    CREATININE 0.72 02/28/2023    BUN 13 02/28/2023    CO2 29 02/28/2023    TSH 0.807 06/10/2020    LABA1C 5.5 03/01/2023    LABMICR Comment 08/26/2019     Lab Results   Component Value Date/Time    MG 2.1 02/13/2023 12:14 PM       Assessment/Plan:       Principal Problem:    Dizziness/vertigo, nausea and vomiting  -Likely secondary to acute CVA involving left cerebellar hemisphere    Active Problems:    Paroxysmal atrial fibrillation (HCC)  -Agree with the addition of beta-blocker therapy and therapeutic anticoagulation with Eliquis for thromboembolic prophylaxis  [id number] Score for Atrial Fibrillation Stroke Risk   Risk   Factors  Component Value   C CHF No 0   H HTN No 0   A2 Age >= 76 Yes,  (67 y.o.) 2   D DM No 0   S2 Prior Stroke/TIA Yes 2   V Vascular Disease No 0   A Age 74-69 No,  (67 y.o.) 0   Sc Sex female 1    [id number]  Score  5   Score last updated 3/1/23 7:97 PM EST    Click here for a link to the UpToDate guideline \"Atrial Fibrillation: Anticoagulation therapy to prevent embolization    Disclaimer: Risk Score calculation is dependent on accuracy of patient problem list and past encounter diagnosis. Recent left cerebellar hemisphere CVA  -Continue Eliquis for thromboembolic prophylaxis      Hyperlipidemia  -Continue atorvastatin      GERD (gastroesophageal reflux disease)  -Treatment per primary      Asthma  -Continue inhalers    Plan today: Agree with the addition of metoprolol -we will switch her to the succinate version once daily as she is wanting to take the least number of pills. monitor heart rhythm on telemetry, agree with the addition of Eliquis 5 mg twice daily for thromboembolic prophylaxis  -Personally reviewed echo which showed preserved LV function with no significant structural or valvular heart disease. Only mild mitral and tricuspid regurgitation noted.   -Maintain potassium greater than 4.0 magnesium greater than 2.0.  -Baseline TSH was normal.    Thank you for requesting cardiac evaluation and allowing us to participate in the care of this patient. We will continue to follow along with you.     Sincerely,  Silvina Omalley MD

## 2023-03-01 NOTE — PROGRESS NOTES
ACUTE OCCUPATIONAL THERAPY GOALS:   (Developed with and agreed upon by patient and/or caregiver.)  1. Patient will perform lower body dressing with supervision. -GOAL MET 3/1/23    2. Patient will perform ADL functional mobility and tranfers in room with supervision. -GOAL MET 3/1/23      Goals to be achieved in 7 days. OCCUPATIONAL THERAPY Initial Assessment, Discharge, and AM       OT Visit Days: 1  Acknowledge Orders  Time  OT Charge Capture  Rehab Caseload Tracker      Presley Olivares is a 74 y.o. female   PRIMARY DIAGNOSIS: Dizziness  TIA (transient ischemic attack) [G45.9]  Dizziness [R42]  Paroxysmal atrial fibrillation ([address] [I48.0]  Acute chest pain [R07.9]       Reason for Referral: Other lack of cordination (R27.8)  Observation: Payor: MEDICARE / Plan: MEDICARE PART A AND B / Product Type: *No Product type* /     ASSESSMENT:     REHAB RECOMMENDATIONS:   Recommendation to date pending progress:  Setting:  No further skilled therapy after discharge from hospital    Equipment:    None     ASSESSMENT:  Ms. Clinton Talbert admitted with above diagnosis. Pt lives alone and independent PTA. Pt states the medicine she is taking makes her feel dizzy. Pt stated medicine made her hallucinate and see blue birds in her room last night. This am, pt feels good. No dizziness noted. Pt independent with donning slippers. Pt independent with household distance functional transfers and mobility. No skilled OT indicated at this time. Will discharge OT.       [employer] AM-PAC 6 Clicks Daily Activity Inpatient Short Form:    AM-PAC Daily Activity - Inpatient   How much help is needed for putting on and taking off regular lower body clothing?: None  How much help is needed for bathing (which includes washing, rinsing, drying)?: None  How much help is needed for toileting (which includes using toilet, bedpan, or urinal)?: None  How much help is needed for putting on and taking off regular upper body clothing?: None  How much help is needed for taking care of personal grooming?: None  How much help for eating meals?: None  AM-PAC Inpatient Daily Activity Raw Score: 24  AM-PAC Inpatient ADL T-Scale Score : 57.54  ADL Inpatient CMS 0-100% Score: 0  ADL Inpatient CMS G-Code Modifier : CH           SUBJECTIVE:     Ms. Arben Pedro states, \"I feel good\"     Social/Functional Lives With: Alone  Type of Home: House  Home Layout: One level  Home Access: Stairs to enter with rails  Entrance Stairs - Number of Steps: 3  Bathroom Shower/Tub: Walk-in shower  ADL Assistance: Independent  Ambulation Assistance: Independent    OBJECTIVE:     Tamara Duron / Torrey Medeiros / Chris Gonzalesau: Telemetry     RESTRICTIONS/PRECAUTIONS:       PAIN: VITALS / O2:   Pre Treatment:   Pain Assessment: None - Denies Pain      Post Treatment: 0/10       Vitals          Oxygen            GROSS EVALUATION: INTACT IMPAIRED   (See Comments)   UE AROM [x] []   UE PROM [] []   Strength [x]       Posture / Balance [x]     Sensation [x]     Coordination [x]       Tone [x]       Edema []    Activity Tolerance [x]       Hand Dominance R [x] L []      COGNITION/  PERCEPTION: INTACT IMPAIRED   (See Comments)   Orientation [x]     Vision [x]     Hearing [x]     Cognition  [x]     Perception [x]       MOBILITY: I Mod I S SBA CGA Min Mod Max Total  NT x2 Comments:   Bed Mobility    Rolling [] [] [] [] [] [] [] [] [] [] []    Supine to Sit [x] [] [] [] [] [] [] [] [] [] []    Scooting [x] [] [] [] [] [] [] [] [] [] []    Sit to Supine [x] [] [] [] [] [] [] [] [] [] []    Transfers    Sit to Stand [x] [] [] [] [] [] [] [] [] [] []    Bed to Chair [x] [] [] [] [] [] [] [] [] [] []    Stand to Sit [x] [] [] [] [] [] [] [] [] [] []    Tub/Shower [] [] [] [] [] [] [] [] [] [] []     Toilet [x] [] [] [] [] [] [] [] [] [] []      [] [] [] [] [] [] [] [] [] [] []    I=Independent, Mod I=Modified Independent, S=Supervision/Setup, SBA=Standby Assistance, CGA=Contact Guard Assistance, Min=Minimal Assistance, Mod=Moderate Assistance, Max=Maximal Assistance, Total=Total Assistance, NT=Not Tested    ACTIVITIES OF DAILY LIVING: I Mod I S SBA CGA Min Mod Max Total NT Comments   BASIC ADLs:              Upper Body Bathing  [] [] [] [] [] [] [] [] [] [x]    Lower Body Bathing [] [] [] [] [] [] [] [] [] [x]    Toileting [] [] [] [] [] [] [] [] [] [x]    Upper Body Dressing [] [] [] [] [] [] [] [] [] [x]    Lower Body Dressing [x] [] [] [] [] [] [] [] [] []    Feeding [x] [] [] [] [] [] [] [] [] []    Grooming [] [] [] [] [] [] [] [] [] []    Personal Device Care [] [] [] [] [] [] [] [] [] []    Functional Mobility [x] [] [] [] [] [] [] [] [] []    I=Independent, Mod I=Modified Independent, S=Supervision/Setup, SBA=Standby Assistance, CGA=Contact Guard Assistance, Min=Minimal Assistance, Mod=Moderate Assistance, Max=Maximal Assistance, Total=Total Assistance, NT=Not Tested    PLAN:   FREQUENCY/DURATION     for duration of hospital stay or until stated goals are met, whichever comes first.    PROBLEM LIST:   (Skilled intervention is medically necessary to address:)     INTERVENTIONS PLANNED:  (Benefits and precautions of occupational therapy have been discussed with the patient.)           TREATMENT:     EVALUATION: LOW COMPLEXITY: (Untimed Charge)    TREATMENT:   Self Care (15 minutes): Patient participated in lower body dressing ADLs in unsupported sitting with minimal verbal cueing to increase independence and increase activity tolerance. Patient also participated in functional mobility and functional transfer training to increase independence and increase activity tolerance.      TREATMENT GRID:  N/A    AFTER TREATMENT PRECAUTIONS: Bed, Bed/Chair Locked, Call light within reach, Needs within reach, RN notified, and Side rails x3    INTERDISCIPLINARY COLLABORATION:  RN/ PCT, PT/ PTA, and OT/ SHIN    EDUCATION:  Education Given To: Patient  Education Provided: Role of Therapy  Education Method: Demonstration;Verbal  Barriers to Learning: None  Education Outcome: Verbalized understanding;Demonstrated understanding    TOTAL TREATMENT DURATION AND TIME:  Time In: 7758  Time Out: 4415  Minutes: 0010 [place], [place]

## 2023-03-01 NOTE — CARE COORDINATION
03/01/23 0817   Service Assessment   Patient Orientation Alert and Oriented   Cognition Alert   History Provided By Patient   Primary [address]   PCP Verified by CM Yes   Prior Functional Level Independent in ADLs/IADLs   Current Functional Level Independent in ADLs/IADLs   Can patient return to prior living arrangement Yes   Ability to make needs known: Good   Family able to assist with home care needs: Yes   Would you like for me to discuss the discharge plan with any other family members/significant others, and if so, who? Yes  (children)   Financial Resources Medicare   Community Resources None   Social/Functional History   Lives With Alone   Type of [address] None   ADL Assistance Independent   Mode of Transportation Car   Discharge Planning   Type of Residence House   Living Arrangements Alone   Current Services Prior To Admission None   Potential Assistance Needed N/A   DME Ordered? No   Potential Assistance Purchasing Medications No   Type of Home Care Services None   [facility]   Transition of Care Consult (CM Consult) N/A   Services At/After Discharge None     RN BAKARI met with the patient at the bedside and discussed discharge planning. She lives alone in a private residence and reports she is independent of ADLs at baseline. She does not use DMEs or external services. She has health insurance and a PCP. No case management needs were identified at this time. RN CM encouraged her to ask questions. Case Management will continue to follow this patient for discharge planning needs. The Medicare Outpatient Observation Notice was delivered and explained to the patient. She verbalized understanding of the information. She was unable to sign the letter at this time since she was having a test performed at the bedside.  The patient was given a copy of the letter and the original was placed in the chart.

## 2023-03-01 NOTE — PROGRESS NOTES
ACUTE PHYSICAL THERAPY GOALS:   (Developed with and agreed upon by patient and/or caregiver.)  STG:  (1.)Ms. Hale will move from supine to sit and sit to supine  with INDEPENDENCE within 1 treatment day(s). MET 3/1/23  (2.)Ms. Hale will transfer from bed to chair and chair to bed with INDEPENDENT using the least restrictive device within 1 treatment day(s). MET 3/1/23  (3.)Ms. Hale will ambulate with INDEPENDENCE for 200 feet with the least restrictive device within 1 treatment day(s). MET 3/1/23  (4.)Pt. will climb up/down 4 steps with 1 rail and SBA within 1 session  MET 3/1/23      ________________________________________________________________________________________________   PHYSICAL THERAPY Initial Assessment, Discharge, and AM  (Link to Caseload Tracking: PT Visit Days : 1  Acknowledge Orders  Time In/Out  PT Charge Capture  Rehab Caseload Tracker    Emily Gee is a 74 y.o. female   PRIMARY DIAGNOSIS: Dizziness  TIA (transient ischemic attack) [G45.9]  Dizziness [R42]  Paroxysmal atrial fibrillation ([address] [I48.0]  Acute chest pain [R07.9]       Reason for Referral: Difficulty in walking, Not elsewhere classified (R26.2)  Observation: Payor: MEDICARE / Plan: MEDICARE PART A AND B / Product Type: *No Product type* /     ASSESSMENT:     REHAB RECOMMENDATIONS:   Recommendation to date pending progress:  Setting:  No further skilled therapy after discharge from hospital    Equipment:    None     ASSESSMENT:  Ms. Michael Swan is admitted after sudden onset of dizziness and R facial numbness. Pt. Was found to be in a fib. She is experiencing a slight decline in standing balance. I encouraged her to watch herself when she performs transitional movements. She asked what type of exs she can do for balance and I shared single leg balance in front of a sink, tandem walking. She lives alone and should be fine returning to that environment. No further PT indicated in hospital or at home.   This am, she performs bed mobility and transfers independently. She amb 200' without a device independently. She climbed up/down 4 steps with 1 rail and SBA. She did perform single limb stance and tandem walking with SBA. Returned to bed. [address] [facility] [facility] [address] [address] AM-PAC 6 Clicks Basic Mobility Inpatient Short Form  AM-PAC Basic Mobility - Inpatient   How much help is needed turning from your back to your side while in a flat bed without using bedrails?: None  How much help is needed moving from lying on your back to sitting on the side of a flat bed without using bedrails?: None  How much help is needed moving to and from a bed to a chair?: None  How much help is needed standing up from a chair using your arms?: None  How much help is needed walking in hospital room?: None  How much help is needed climbing 3-5 steps with a railing?: A Little  [facility] Inpatient Mobility Raw Score : 23  AM-PAC Inpatient T-Scale Score : 56.93  Mobility Inpatient CMS 0-100% Score: 11.2  Mobility Inpatient CMS G-Code Modifier : CI    SUBJECTIVE:   Ms. Jose Cruz Boyd states, \"I was hallucinating from that medicine last night. I was seeing birds. \"     Social/Functional Lives With: Alone  Type of Home: House  Home Layout: One level  Home Access: Stairs to enter without rails  Entrance Stairs - Number of Steps: 3  Bathroom Shower/Tub: Walk-in shower  Bathroom Toilet: Standard  Home Equipment: None  ADL Assistance: Independent  Homemaking Assistance: Independent  Ambulation Assistance: Independent  Transfer Assistance: Independent  Active : Yes  Mode of Transportation: Car  Occupation: Retired    OBJECTIVE:     PAIN: VITALS / O2: PRECAUTION / [place]ette Bath / DRAINS:   Pre Treatment:   Pain Assessment: None - Denies Pain      Post Treatment: 0 Vitals        Oxygen      Telemetry     RESTRICTIONS/PRECAUTIONS:                    GROSS EVALUATION: Intact Impaired (Comments):   AROM [x]  B LEs   PROM []    Strength [x]  Grossly 4/5 B LEs   Balance []  Mild decrease dynamic standing balance   Posture [] WFL   Sensation [x]  B LEs   Coordination [x]   B LEs   Tone [x]  B LEs   Edema []    Activity Tolerance [x]  For amb 200' and stair climbing    []      COGNITION/  PERCEPTION: Intact Impaired (Comments):   Orientation [x]     Vision [x]     Hearing [x]     Cognition  [x]       MOBILITY: I Mod I S SBA CGA Min Mod Max Total  NT x2 Comments:   Bed Mobility    Rolling [x] [] [] [] [] [] [] [] [] [] []    Supine to Sit [x] [] [] [] [] [] [] [] [] [] []    Scooting [x] [] [] [] [] [] [] [] [] [] []    Sit to Supine [x] [] [] [] [] [] [] [] [] [] []    Transfers    Sit to Stand [x] [] [] [] [] [] [] [] [] [] []    Bed to Chair [x] [] [] [] [] [] [] [] [] [] []    Stand to Sit [x] [] [] [] [] [] [] [] [] [] []     [] [] [] [] [] [] [] [] [] [] []    I=Independent, Mod I=Modified Independent, S=Supervision, SBA=Standby Assistance, CGA=Contact Guard Assistance,   Min=Minimal Assistance, Mod=Moderate Assistance, Max=Maximal Assistance, Total=Total Assistance, NT=Not Tested    GAIT: I Mod I S SBA CGA Min Mod Max Total  NT x2 Comments:   Level of Assistance [x] [] [] [] [] [] [] [] [] [] []    Distance 200 feet    DME None    Gait Quality Narrow base of support    Weightbearing Status      Stairs Stairs/Curb  Stairs?: Yes  Stairs  # Steps : 4  Rails: Left ascending  Assistance: Stand by assistance    I=Independent, Mod I=Modified Independent, S=Supervision, SBA=Standby Assistance, CGA=Contact Guard Assistance,   Min=Minimal Assistance, Mod=Moderate Assistance, Max=Maximal Assistance, Total=Total Assistance, NT=Not Tested    PLAN:   FREQUENCY AND DURATION:  (eval/DC)   THERAPY PROGNOSIS: Excellent    PROBLEM LIST:   (Skilled intervention is medically necessary to address:)  Decreased Balance  Decreased Gait Ability INTERVENTIONS PLANNED:   (Benefits and precautions of physical therapy have been discussed with the patient.)  Gait Training  Review some advanced balance activities to perform at IN TREATMENT:   EVALUATION: LOW COMPLEXITY: (Untimed Charge)    TREATMENT:   Therapeutic Activity (10 Minutes): Therapeutic activity included Ambulation on level ground, Stair Training, and Standing balance to improve functional Activity tolerance, Balance, and Mobility.     TREATMENT GRID:   Date:  3/1/23 Date:   Date:     Activity/Exercise Parameters Parameters Parameters   STANDING: Tandem walking forward 6' SBA       Single limb balance L 5 sec  R 10 sec                                       AFTER TREATMENT PRECAUTIONS: Bed, Bed/Chair Locked, Call light within reach, Needs within reach, RN notified, and Side rails x3    INTERDISCIPLINARY COLLABORATION:  RN/ PCT, PT/ PTA, and OT/ SHIN    EDUCATION:      TIME IN/OUT:  Time In: 0700  Time Out: 0720  Minutes: [facility] [place], PT

## 2023-03-02 VITALS
DIASTOLIC BLOOD PRESSURE: 69 MMHG | OXYGEN SATURATION: 95 % | RESPIRATION RATE: 17 BRPM | BODY MASS INDEX: 24.66 KG/M2 | HEART RATE: 73 BPM | TEMPERATURE: 98.1 F | WEIGHT: 148 LBS | SYSTOLIC BLOOD PRESSURE: 110 MMHG | HEIGHT: 65 IN

## 2023-03-02 PROBLEM — R42 DIZZINESS: Status: RESOLVED | Noted: 2023-02-28 | Resolved: 2023-03-02

## 2023-03-02 PROBLEM — I48.91 A-FIB (HCC): Status: ACTIVE | Noted: 2023-03-02

## 2023-03-02 LAB
ANION GAP SERPL CALC-SCNC: 6 MMOL/L (ref 2–11)
BASOPHILS # BLD: 0 K/UL (ref 0–0.2)
BASOPHILS NFR BLD: 1 % (ref 0–2)
BUN SERPL-MCNC: 12 MG/DL (ref 8–23)
CALCIUM SERPL-MCNC: 9.2 MG/DL (ref 8.3–10.4)
CHLORIDE SERPL-SCNC: 108 MMOL/L (ref 101–110)
CO2 SERPL-SCNC: 27 MMOL/L (ref 21–32)
CREAT SERPL-MCNC: 0.67 MG/DL (ref 0.6–1)
DIFFERENTIAL METHOD BLD: NORMAL
EOSINOPHIL # BLD: 0.2 K/UL (ref 0–0.8)
EOSINOPHIL NFR BLD: 3 % (ref 0.5–7.8)
ERYTHROCYTE [DISTWIDTH] IN BLOOD BY AUTOMATED COUNT: 12.3 % (ref 11.9–14.6)
GLUCOSE SERPL-MCNC: 112 MG/DL (ref 65–100)
HCT VFR BLD AUTO: 43 % (ref 35.8–46.3)
HGB BLD-MCNC: 14.8 G/DL (ref 11.7–15.4)
IMM GRANULOCYTES # BLD AUTO: 0 K/UL (ref 0–0.5)
IMM GRANULOCYTES NFR BLD AUTO: 0 % (ref 0–5)
LYMPHOCYTES # BLD: 1.7 K/UL (ref 0.5–4.6)
LYMPHOCYTES NFR BLD: 25 % (ref 13–44)
MCH RBC QN AUTO: 31.6 PG (ref 26.1–32.9)
MCHC RBC AUTO-ENTMCNC: 34.4 G/DL (ref 31.4–35)
MCV RBC AUTO: 91.7 FL (ref 82–102)
MONOCYTES # BLD: 0.5 K/UL (ref 0.1–1.3)
MONOCYTES NFR BLD: 8 % (ref 4–12)
NEUTS SEG # BLD: 4.3 K/UL (ref 1.7–8.2)
NEUTS SEG NFR BLD: 63 % (ref 43–78)
NRBC # BLD: 0 K/UL (ref 0–0.2)
PLATELET # BLD AUTO: 238 K/UL (ref 150–450)
PMV BLD AUTO: 9.4 FL (ref 9.4–12.3)
POTASSIUM SERPL-SCNC: 3.9 MMOL/L (ref 3.5–5.1)
RBC # BLD AUTO: 4.69 M/UL (ref 4.05–5.2)
SODIUM SERPL-SCNC: 141 MMOL/L (ref 133–143)
WBC # BLD AUTO: 6.8 K/UL (ref 4.3–11.1)

## 2023-03-02 PROCEDURE — 6370000000 HC RX 637 (ALT 250 FOR IP): Performed by: NURSE PRACTITIONER

## 2023-03-02 PROCEDURE — 85025 COMPLETE CBC W/AUTO DIFF WBC: CPT

## 2023-03-02 PROCEDURE — 80048 BASIC METABOLIC PNL TOTAL CA: CPT

## 2023-03-02 PROCEDURE — 1100000003 HC PRIVATE W/ TELEMETRY

## 2023-03-02 PROCEDURE — 36415 COLL VENOUS BLD VENIPUNCTURE: CPT

## 2023-03-02 PROCEDURE — G0378 HOSPITAL OBSERVATION PER HR: HCPCS

## 2023-03-02 RX ORDER — ASPIRIN 81 MG/1
81 TABLET ORAL DAILY
Qty: 30 TABLET | Refills: 0 | Status: SHIPPED | OUTPATIENT
Start: 2023-03-03

## 2023-03-02 RX ORDER — METOPROLOL SUCCINATE 25 MG/1
25 TABLET, EXTENDED RELEASE ORAL EVERY 24 HOURS
Qty: 30 TABLET | Refills: 0 | Status: SHIPPED | OUTPATIENT
Start: 2023-03-02

## 2023-03-02 RX ORDER — ATORVASTATIN CALCIUM 80 MG/1
40 TABLET, FILM COATED ORAL NIGHTLY
Qty: 30 TABLET | Refills: 0 | Status: SHIPPED | OUTPATIENT
Start: 2023-03-02

## 2023-03-02 RX ADMIN — APIXABAN 5 MG: 5 TABLET, FILM COATED ORAL at 08:23

## 2023-03-02 RX ADMIN — ASPIRIN 81 MG: 81 TABLET, COATED ORAL at 08:23

## 2023-03-02 NOTE — CARE COORDINATION
RN BAKARI met with the patient at the bedside and reviewed her discharge plan. She confirmed she is in agreement with the discharge plan and has transportation home today. The Important Message from Medicare letter was delivered and explained to the patient. She verbalized understanding of the information and signed the letter. The patient was given a copy of the letter and the original was placed in the chart.

## 2023-03-02 NOTE — PROGRESS NOTES
Discharge paperwork discussed with the patient and all questioned fully answered. Iv removed. All belongings and discharge paperwork sent with pt. Pt ambulated down to transportation.

## 2023-03-02 NOTE — DISCHARGE SUMMARY
Hospitalist Discharge Summary   Admit Date:  2023 11:42 AM   DC Note date: 3/2/2023  Name:  Jose Miguel Owusu   Age:  74 y.o. Sex:  female  :  1950   MRN:  [id number]   Room:  [facility]  PCP:  LEONA Monge    Presenting Complaint: Numbness     Initial Admission Diagnosis: TIA (transient ischemic attack) [G45.9]  Dizziness [R42]  Paroxysmal atrial fibrillation (HCC) [I48.0]  Acute chest pain [R07.9]  A-fib (HCC) [I48.91]     Problem List for this Hospitalization (present on admission):    Principal Problem (Resolved):    Dizziness  Active Problems:    Paroxysmal atrial fibrillation (HCC)    A-fib ([address]    Hyperlipidemia    GERD (gastroesophageal reflux disease)    Asthma      Hospital Course:  74 y.o. female with medical history of breast cancer, anxiety, asthma, hyperlipidemia admitted  for dizziness. She reported onset of right facial numbness at the corner of her mouth which has resolved. CT head without acute findings. Neurology evaluated virtually in ER. Pt not TPA candidate. Symptoms have resolved. Pt found to be in afib. Has had a 2 week holter monitor on for similar complaints outpatient and was found to be in afib. Cardiology consulted. Pt started on eliquis and metoprolol. Pt started on ASA and lipitor as well. MRI with remote CVA. Echo with normal EF, diastolic function, no PFO. Pt instructed on bleeding risk with eliquis and to call 911 or come to ER with any GI bleeding, trauma, stroke like symptoms. Pt verbalized understanding. Denies CP, SOB, n/v/d, abd pain. Disposition: home  Diet: ADULT DIET; Regular  Code Status: Full Code    Follow Ups:   Follow-up Information     [facility] CARDIOLOGY. Call. Specialty: Cardiology  Contact information:  [address] [place]le [place] [address] [address]  [facility], [address] Follow up.     Specialties: Gastroenterology, Physician Assistant  Contact information:  [address] [address]  [phone]                       Time spent in patient discharge and coordination 45 minutes. Follow up labs/diagnostics (ultimately defer to outpatient provider):      Plan was discussed with pt. All questions answered. Patient was stable at time of discharge. Instructions given to call a physician or return if any concerns.     Current Discharge Medication List        START taking these medications    Details   aspirin 81 MG EC tablet Take 1 tablet by mouth daily  Qty: 30 tablet, Refills: 0      apixaban (ELIQUIS) 5 MG TABS tablet Take 1 tablet by mouth 2 times daily  Qty: 60 tablet, Refills: 0      atorvastatin (LIPITOR) 80 MG tablet Take 0.5 tablets by mouth nightly  Qty: 30 tablet, Refills: 0      metoprolol succinate (TOPROL XL) 25 MG extended release tablet Take 1 tablet by mouth every 24 hours  Qty: 30 tablet, Refills: 0           CONTINUE these medications which have NOT CHANGED    Details   albuterol sulfate  (90 Base) MCG/ACT inhaler Inhale 2 puffs into the lungs every 6 hours as needed      ascorbic acid (VITAMIN C) 500 MG tablet Take by mouth      vitamin D3 (CHOLECALCIFEROL) 125 MCG (5000 UT) TABS tablet Take 5,000 Units by mouth daily      fluticasone (FLONASE) 50 MCG/ACT nasal spray SPRAY 1 SPRAY INTO EACH NOSTRIL EVERY DAY      loratadine (CLARITIN) 10 MG tablet 1 TABLET ONCE A DAY AM ORALLY 30 DAY(S)      miconazole (MICOTIN) 2 % powder Apply topically 3 times daily      mupirocin (BACTROBAN) 2 % ointment APPLY TO AFFECTED AREA TWICE A DAY           STOP taking these medications       ondansetron (ZOFRAN) 4 MG tablet Comments:   Reason for Stopping:         budesonide-formoterol (SYMBICORT) 160-4.5 MCG/ACT AERO Comments:   Reason for Stopping:         busPIRone (BUSPAR) 10 MG tablet Comments:   Reason for Stopping:         montelukast (SINGULAIR) 10 MG tablet Comments:   Reason for Stopping:               Some medications may have been reported old/obsolete and marked \"stop taking\" by the system; in reality pt was already off these meds; defer to outpatient or prescribing providers. Procedures done this admission:  * No surgery found *    Consults this admission:  IP CONSULT TO CARDIOLOGY    Echocardiogram results:  02/28/23    TRANSTHORACIC ECHOCARDIOGRAM (TTE) COMPLETE (CONTRAST/BUBBLE/3D PRN) 03/01/2023  9:26 AM, 03/01/2023 12:00 AM (Final)    Interpretation Summary    Left Ventricle: Normal left ventricular systolic function with a visually estimated EF of 55 - 60%. Left ventricle size is normal. Normal wall thickness. Normal wall motion. Normal diastolic function. Aortic Valve: Mild sclerosis of the aortic valve cusp. Mitral Valve: Mild regurgitation. Tricuspid Valve: Mild regurgitation. Mildly elevated RVSP. The estimated RVSP is 31 mmHg. Interatrial Septum: Agitated saline study was negative with and without provocation. Technical qualifiers: Color flow Doppler was performed and pulse wave and/or continuous wave Doppler was performed. Signed by: Skyler De La Garza MD on 3/1/2023  9:26 AM, Signed by: Unknown Provider Result on 3/1/2023 12:00 AM      Diagnostic Imaging/Tests:   CT HEAD WO CONTRAST    Result Date: 2/28/2023  -No acute intracranial abnormality. -Small-volume fluid within the sphenoid sinuses. MRI BRAIN WO CONTRAST    Result Date: 3/1/2023  1. No evidence of acute infarction. 2. Minor patchy and few punctate foci of T2 hyperintensity within the supratentorial white matter. Please see above. 3. Small remote left cerebellar infarction. 4. Mild paranasal sinus disease.         Labs: Results:       BMP, Mg, Phos Recent Labs     02/28/23  1136 03/02/23  0606    141   K 3.9 3.9    108   CO2 29 27   ANIONGAP 6 6   BUN 13 12   CREATININE 0.72 0.67   LABGLOM >60 >60   CALCIUM 9.5 9.2   GLUCOSE 110* 112*      CBC Recent Labs     02/28/23  1136 03/01/23  0540 03/02/23  0606   WBC 8.3 6.0 6.8   RBC 5.05 4.47 4.69   HGB 15.9* 14.1 14.8 HCT 46.9* 40.8 43.0   MCV 92.9 91.3 91.7   MCH 31.5 31.5 31.6   MCHC 33.9 34.6 34.4   RDW 12.1 12.1 12.3    217 238   MPV 9.9 9.8 9.4   NRBC 0.00 0.00 0.00   SEGS 71  --  63   LYMPHOPCT 20  --  25   EOSRELPCT 1  --  3   MONOPCT 8  --  8   BASOPCT 1  --  1   IMMGRAN 0  --  0   SEGSABS 5.9  --  4.3   LYMPHSABS 1.6  --  1.7   EOSABS 0.1  --  0.2   MONOSABS 0.6  --  0.5   BASOSABS 0.0  --  0.0   ABSIMMGRAN 0.0  --  0.0      LFT Recent Labs     02/28/23  1136   BILITOT 1.3*   ALKPHOS 116   AST 17   ALT 23   PROT 8.1   LABALBU 4.1   GLOB 4.0      Cardiac  Lab Results   Component Value Date/Time    TROPHS 8.0 02/28/2023 11:36 AM      Coags No results found for: PROTIME, INR, APTT   A1c Lab Results   Component Value Date/Time    LABA1C 5.5 03/01/2023 05:40 AM     03/01/2023 05:40 AM      Lipids Lab Results   Component Value Date/Time    CHOL 241 03/01/2023 05:40 AM    LDLCALC 169 03/01/2023 05:40 AM    LABVLDL 13 03/01/2023 05:40 AM    HDL 59 03/01/2023 05:40 AM    CHOLHDLRATIO 4.1 03/01/2023 05:40 AM    TRIG 65 03/01/2023 05:40 AM      Thyroid  No results found for: Curlee Favors     Most Recent UA Lab Results   Component Value Date/Time    COLORU YELLOW/STRAW 02/13/2023 12:19 PM    APPEARANCE CLEAR 02/13/2023 12:19 PM    SPECGRAV 1.007 02/13/2023 12:19 PM    LABPH 8.0 02/13/2023 12:19 PM    PROTEINU Negative 02/13/2023 12:19 PM    GLUCOSEU Negative 02/13/2023 12:19 PM    KETUA Negative 02/13/2023 12:19 PM    BILIRUBINUR Negative 02/13/2023 12:19 PM    BILIRUBINUR Negative 08/26/2019 09:46 AM    BLOODU Negative 02/13/2023 12:19 PM    UROBILINOGEN 0.2 02/13/2023 12:19 PM    NITRU Negative 02/13/2023 12:19 PM    LEUKOCYTESUR Negative 02/13/2023 12:19 PM        No results for input(s): CULTURE in the last 720 hours.     All Labs from Last 24 Hrs:  Recent Results (from the past 24 hour(s))   CBC with Auto Differential    Collection Time: 03/02/23  6:06 AM   Result Value Ref Range    WBC 6.8 4.3 - 11.1 K/uL RBC 4.69 4.05 - 5.2 M/uL    Hemoglobin 14.8 11.7 - 15.4 g/dL    Hematocrit 43.0 35.8 - 46.3 %    MCV 91.7 82.0 - 102.0 FL    MCH 31.6 26.1 - 32.9 PG    MCHC 34.4 31.4 - 35.0 g/dL    RDW 12.3 11.9 - 14.6 %    Platelets 208 745 - 199 K/uL    MPV 9.4 9.4 - 12.3 FL    nRBC 0.00 0.0 - 0.2 K/uL    Differential Type AUTOMATED      Seg Neutrophils 63 43 - 78 %    Lymphocytes 25 13 - 44 %    Monocytes 8 4.0 - 12.0 %    Eosinophils % 3 0.5 - 7.8 %    Basophils 1 0.0 - 2.0 %    Immature Granulocytes 0 0.0 - 5.0 %    Segs Absolute 4.3 1.7 - 8.2 K/UL    Absolute Lymph # 1.7 0.5 - 4.6 K/UL    Absolute Mono # 0.5 0.1 - 1.3 K/UL    Absolute Eos # 0.2 0.0 - 0.8 K/UL    Basophils Absolute 0.0 0.0 - 0.2 K/UL    Absolute Immature Granulocyte 0.0 0.0 - 0.5 K/UL   Basic Metabolic Panel w/ Reflex to MG    Collection Time: 03/02/23  6:06 AM   Result Value Ref Range    Sodium 141 133 - 143 mmol/L    Potassium 3.9 3.5 - 5.1 mmol/L    Chloride 108 101 - 110 mmol/L    CO2 27 21 - 32 mmol/L    Anion Gap 6 2 - 11 mmol/L    Glucose 112 (H) 65 - 100 mg/dL    BUN 12 8 - 23 MG/DL    Creatinine 0.67 0.6 - 1.0 MG/DL    Est, Glom Filt Rate >60 >60 ml/min/1.73m2    Calcium 9.2 8.3 - 10.4 MG/DL       Allergies   Allergen Reactions    Penicillins Itching, Rash and Shortness Of Breath    Sulfa Antibiotics Anaphylaxis, Itching, Other (See Comments), Rash and Swelling     Edema (face, throat swelling)    Codeine Itching     Bilateral feet    Ciprofloxacin Nausea Only    Prednisone Itching     \"Racing heart\", \"like the top of my head was going to blow off\"    Triamcinolone Acetonide Other (See Comments)     Injection, noted itchiness, red face     Immunization History   Administered Date(s) Administered    COVID-19, MODERNA BLUE border, Primary or Immunocompromised, (age 12y+), IM, 100 mcg/0.5mL 05/25/2021    Influenza Trivalent 10/22/2010, 10/21/2011    Pneumococcal Conjugate 13-valent ([id number]) 07/08/2016    Pneumococcal Polysaccharide ([id number]) 12/09/2013    Tdap (Boostrix, Adacel) 07/07/2015    Zoster Live (Zostavax) 05/01/2016       Recent Vital Data:  Patient Vitals for the past 24 hrs:   Temp Pulse Resp BP SpO2   03/02/23 1129 98.1 °F (36.7 °C) 73 17 110/69 95 %   03/02/23 0741 97.9 °F (36.6 °C) 79 17 123/70 99 %   03/02/23 0404 97.7 °F (36.5 °C) 86 14 128/81 98 %   03/02/23 0042 97.3 °F (36.3 °C) 67 16 95/60 96 %   03/01/23 1937 97.5 °F (36.4 °C) 90 16 127/89 95 %   03/01/23 1656 97.7 °F (36.5 °C) 81 17 138/80 95 %       Oxygen Therapy  SpO2: 95 %  Pulse Oximeter Device Mode: Intermittent  Pulse Oximeter Device Location: Finger  O2 Device: None (Room air)    Estimated body mass index is 24.63 kg/m² as calculated from the following:    Height as of this encounter: 5' 5\" (1.651 m). Weight as of this encounter: 148 lb (67.1 kg). Intake/Output Summary (Last 24 hours) at 3/2/2023 1258  Last data filed at 3/2/2023 4697  Gross per 24 hour   Intake 390 ml   Output --   Net 390 ml         Physical Exam:    General:    Well nourished. No overt distress  Head:  Normocephalic, atraumatic  Eyes:  Sclerae appear normal.  Pupils equally round. HENT:  Nares appear normal, no drainage. Moist mucous membranes  Neck:  No restricted ROM. Trachea midline  CV:   RRR. No m/r/g. No JVD  Lungs:   CTAB. No wheezing, rhonchi, or rales. Respirations even, unlabored  Abdomen:   Soft, nontender, nondistended. Extremities: Warm and dry. No cyanosis or clubbing. No edema. Skin:     No rashes. Normal coloration  Neuro:  CN II-XII grossly intact. Psych:  Normal mood and affect.     Signed:  Caryl Ocasio, APRN - CNP    Notes, labs, VS, diagnostic testing reviewed  Case reviewed with pt, CM, nursing, Dr. Tiff Sanz

## 2023-03-02 NOTE — CARE COORDINATION
03/02/23 1310   Service Assessment   Patient Orientation Alert and Oriented   Cognition Alert   History Provided By Patient   Primary [address]   PCP Verified by CM Yes   Prior Functional Level Independent in ADLs/IADLs   Current Functional Level Independent in ADLs/IADLs   Can patient return to prior living arrangement Yes   Ability to make needs known: Good   Family able to assist with home care needs: Yes   Financial Resources [employer] Other (Comment)  (She was provided with Eliquis coupons)   Social/Functional History   Lives With Alone   Type of [address] None   ADL Assistance Independent   Mode of Transportation Car   Discharge Planning   Type of Residence House   Living Arrangements Alone   Current Services Prior To Admission None   Potential Assistance Needed N/A   DME Ordered? No   Potential Assistance Purchasing Medications No   Type of Home Care Services None   Patient expects to be discharged to: [facility] [address] Discharge   Transition of Care Consult (CM Consult) N/A   Services At/After Discharge None     The patient is discharging home. She was evaluated by therapy services during this admission. Discharge planning was discussed with the Interdisciplinary Team. No case management needs were identified.

## 2023-03-03 ENCOUNTER — TELEPHONE (OUTPATIENT)
Dept: CARDIOLOGY CLINIC | Age: 73
End: 2023-03-03

## 2023-03-22 NOTE — PROGRESS NOTES
Physician Progress Note      Cesar Winston  [facility] #:                  [id number]  :                       1950  ADMIT DATE:       2023 11:42 AM  DISCH DATE:        3/2/2023 3:27 PM  RESPONDING  PROVIDER #:        Denzel Alves DO          QUERY TEXT:    Pt admitted with dizziness and facial numbness. Noted documentation of TIA in   the medical record. If possible, please document in progress notes and   discharge summary:    The medical record reflects the following:  Risk Factors: 74 YOF with breast cancer, anxiety, asthma, hyperlipidemia, a   fib  Clinical Indicators: Presented to ER with dizziness and facial numbness. CT   Head without acute findings. Treatment: neuro consult, Eliquis, Metoprolol, Aspirin and Lipitor. Chao Greer, CDI Supervisor  [facility] [email or web address]  [email or web address]  Options provided:  -- TIA  -- No TIA  -- Other - I will add my own diagnosis  -- Disagree - Not applicable / Not valid  -- Disagree - Clinically unable to determine / Unknown  -- Refer to Clinical Documentation Reviewer    PROVIDER RESPONSE TEXT:    Forward to [employer] created by: Saúl Mendoza on 3/8/2023 10:40 AM      QUERY TEXT:    Patient admitted with dizziness, noted to have paroxysmal atrial fibrillation   started on Eliquis. If possible, please document in progress notes and   discharge summary if you are evaluating and/or treating any of the following: The medical record reflects the following:  Risk Factors: 74 YOF with a fib, prior stroke/TIA per H&P, Holter monitor done   on an outpatient basis which showed atrial fibrillation.   Clinical Indicators: Per H&P [id number] VA Sc score of 5  Treatment: Eliquis  Options provided:  -- Secondary hypercoagulable state in a patient with atrial fibrillation  -- Other - I will add my own diagnosis  -- Disagree - Not applicable / Not valid  -- Disagree - Clinically unable to determine / Unknown  -- Refer

## 2023-03-23 NOTE — PROGRESS NOTES
valid  -- Disagree - Clinically unable to determine / Unknown  -- Refer to Clinical Documentation Reviewer    PROVIDER RESPONSE TEXT:    This patient had a TIA.     Query created by: Cari Kang on 3/23/2023 10:50 AM      Electronically signed by:  Rowan Prescott NP 3/23/2023 11:00 AM

## 2023-04-07 ENCOUNTER — HOSPITAL ENCOUNTER (OUTPATIENT)
Dept: MAMMOGRAPHY | Age: 73
End: 2023-04-07
Payer: MEDICARE

## 2023-04-07 DIAGNOSIS — Z12.31 VISIT FOR SCREENING MAMMOGRAM: ICD-10-CM

## 2023-04-07 PROCEDURE — 77063 BREAST TOMOSYNTHESIS BI: CPT

## 2023-04-17 ENCOUNTER — HOSPITAL ENCOUNTER (OUTPATIENT)
Dept: ULTRASOUND IMAGING | Age: 73
Discharge: HOME | End: 2023-04-20
Payer: MEDICARE

## 2023-04-17 DIAGNOSIS — E04.1 THYROID NODULE: ICD-10-CM

## 2023-04-17 PROCEDURE — 76536 US EXAM OF HEAD AND NECK: CPT

## 2023-04-19 ENCOUNTER — INITIAL CONSULT (OUTPATIENT)
Dept: CARDIOLOGY CLINIC | Age: 73
End: 2023-04-19
Payer: MEDICARE

## 2023-04-19 VITALS
HEIGHT: 65 IN | DIASTOLIC BLOOD PRESSURE: 64 MMHG | BODY MASS INDEX: 24.61 KG/M2 | SYSTOLIC BLOOD PRESSURE: 122 MMHG | WEIGHT: 147.7 LBS | HEART RATE: 84 BPM

## 2023-04-19 DIAGNOSIS — Z86.73 H/O: CVA (CEREBROVASCULAR ACCIDENT): ICD-10-CM

## 2023-04-19 DIAGNOSIS — I48.0 PAROXYSMAL ATRIAL FIBRILLATION (HCC): Primary | ICD-10-CM

## 2023-04-19 DIAGNOSIS — E78.01 FAMILIAL HYPERCHOLESTEROLEMIA: ICD-10-CM

## 2023-04-19 DIAGNOSIS — Z79.01 CHRONIC ANTICOAGULATION: ICD-10-CM

## 2023-04-19 PROCEDURE — G8400 PT W/DXA NO RESULTS DOC: HCPCS | Performed by: INTERNAL MEDICINE

## 2023-04-19 PROCEDURE — G8420 CALC BMI NORM PARAMETERS: HCPCS | Performed by: INTERNAL MEDICINE

## 2023-04-19 PROCEDURE — G8427 DOCREV CUR MEDS BY ELIG CLIN: HCPCS | Performed by: INTERNAL MEDICINE

## 2023-04-19 PROCEDURE — 3017F COLORECTAL CA SCREEN DOC REV: CPT | Performed by: INTERNAL MEDICINE

## 2023-04-19 PROCEDURE — 99214 OFFICE O/P EST MOD 30 MIN: CPT | Performed by: INTERNAL MEDICINE

## 2023-04-19 PROCEDURE — 1090F PRES/ABSN URINE INCON ASSESS: CPT | Performed by: INTERNAL MEDICINE

## 2023-04-19 PROCEDURE — 1123F ACP DISCUSS/DSCN MKR DOCD: CPT | Performed by: INTERNAL MEDICINE

## 2023-04-19 PROCEDURE — 1036F TOBACCO NON-USER: CPT | Performed by: INTERNAL MEDICINE

## 2023-04-19 RX ORDER — METOPROLOL SUCCINATE 25 MG/1
25 CAPSULE, EXTENDED RELEASE ORAL DAILY
Qty: 30 CAPSULE | Status: SHIPPED | COMMUNITY
Start: 2023-04-19

## 2023-04-19 RX ORDER — METOPROLOL SUCCINATE 25 MG/1
CAPSULE, EXTENDED RELEASE ORAL
COMMUNITY
End: 2023-04-19 | Stop reason: DRUGHIGH

## 2023-04-19 RX ORDER — CETIRIZINE HYDROCHLORIDE 10 MG/1
10 TABLET ORAL DAILY
COMMUNITY

## 2023-04-19 RX ORDER — ROSUVASTATIN CALCIUM 20 MG/1
TABLET, COATED ORAL
COMMUNITY
Start: 2023-04-06

## 2023-04-19 ASSESSMENT — ENCOUNTER SYMPTOMS
EYE REDNESS: 0
DOUBLE VISION: 0
HEMATOCHEZIA: 0
ABDOMINAL PAIN: 0
HOARSE VOICE: 0
HEMOPTYSIS: 0
STRIDOR: 0
WHEEZING: 0
HEMATEMESIS: 0

## 2023-04-19 NOTE — PROGRESS NOTES
aortic valve cusp. Mitral Valve: Mild regurgitation. Tricuspid Valve: Mild regurgitation. Mildly elevated RVSP. The estimated RVSP is 31 mmHg. Interatrial Septum: Agitated saline study was negative with and without provocation. Technical qualifiers: Color flow Doppler was performed and pulse wave and/or continuous wave Doppler was performed. Signed by: Denilson Hughes MD on 3/1/2023  9:26 AM, Signed by: Unknown Provider Result on 3/1/2023 12:00 AM     Lab Results   Component Value Date    HGB 14.8 03/02/2023      Lab Results   Component Value Date     03/02/2023        ASSESSMENT and PLAN    Paroxysmal atrial fibrillation (HCC)  -Maintaining sinus rhythm on metoprolol succinate 25 mg once daily and  Eliquis for thromboembolic prophylaxis-continue    H/O: CVA (cerebrovascular accident)  - [id number] score of 5-continue Eliquis for thromboembolic prophylaxis    Familial hypercholesterolemia  LDL has been as high as 190-continue rosuvastatin therapy-high intensity statin therapy especially with previous CVA    Chronic anticoagulation  Continue Eliquis for thromboembolic prophylaxis. Overall Impression  I made no changes with her medical therapy. She is on appropriate medical therapy with her long-acting beta-blocker to suppress A-fib, Eliquis for thromboembolic prophylaxis especially with prior CVAs and an elevated [id number] score of 5, high intensity statin therapy especially with familial hyperlipidemia. Heart rates and blood pressures are excellent. Maintaining sinus rhythm currently. We will see her in follow-up in a good 6 months time    Return in about 6 months (around 10/19/2023) for afib, hld. Thank you for allowing us to participate in the care of your patient. If you have any further questions, please do not hesitate to contact us.   Sincerely,        Lanie Hobson MD   4/19/2023

## 2023-04-27 ENCOUNTER — HOSPITAL ENCOUNTER (EMERGENCY)
Age: 73
Discharge: HOME | End: 2023-04-27
Attending: STUDENT IN AN ORGANIZED HEALTH CARE EDUCATION/TRAINING PROGRAM
Payer: MEDICARE

## 2023-04-27 ENCOUNTER — APPOINTMENT (OUTPATIENT)
Dept: CT IMAGING | Age: 73
End: 2023-04-27
Payer: MEDICARE

## 2023-04-27 VITALS
RESPIRATION RATE: 12 BRPM | TEMPERATURE: 97.9 F | OXYGEN SATURATION: 96 % | WEIGHT: 148 LBS | SYSTOLIC BLOOD PRESSURE: 127 MMHG | BODY MASS INDEX: 24.66 KG/M2 | DIASTOLIC BLOOD PRESSURE: 70 MMHG | HEART RATE: 63 BPM | HEIGHT: 65 IN

## 2023-04-27 DIAGNOSIS — R51.9 NONINTRACTABLE HEADACHE, UNSPECIFIED CHRONICITY PATTERN, UNSPECIFIED HEADACHE TYPE: Primary | ICD-10-CM

## 2023-04-27 LAB
ALBUMIN SERPL-MCNC: 3.4 G/DL (ref 3.2–4.6)
ALBUMIN/GLOB SERPL: 1 (ref 0.4–1.6)
ALP SERPL-CCNC: 119 U/L (ref 50–136)
ALT SERPL-CCNC: 30 U/L (ref 12–65)
ANION GAP SERPL CALC-SCNC: 2 MMOL/L (ref 2–11)
AST SERPL-CCNC: 19 U/L (ref 15–37)
BASOPHILS # BLD: 0 K/UL (ref 0–0.2)
BASOPHILS NFR BLD: 1 % (ref 0–2)
BILIRUB SERPL-MCNC: 1.5 MG/DL (ref 0.2–1.1)
BUN SERPL-MCNC: 9 MG/DL (ref 8–23)
CALCIUM SERPL-MCNC: 9 MG/DL (ref 8.3–10.4)
CHLORIDE SERPL-SCNC: 108 MMOL/L (ref 101–110)
CO2 SERPL-SCNC: 27 MMOL/L (ref 21–32)
CREAT SERPL-MCNC: 0.65 MG/DL (ref 0.6–1)
DIFFERENTIAL METHOD BLD: NORMAL
EOSINOPHIL # BLD: 0.3 K/UL (ref 0–0.8)
EOSINOPHIL NFR BLD: 4 % (ref 0.5–7.8)
ERYTHROCYTE [DISTWIDTH] IN BLOOD BY AUTOMATED COUNT: 12.2 % (ref 11.9–14.6)
GLOBULIN SER CALC-MCNC: 3.5 G/DL (ref 2.8–4.5)
GLUCOSE SERPL-MCNC: 142 MG/DL (ref 65–100)
HCT VFR BLD AUTO: 39.6 % (ref 35.8–46.3)
HGB BLD-MCNC: 13.5 G/DL (ref 11.7–15.4)
IMM GRANULOCYTES # BLD AUTO: 0 K/UL (ref 0–0.5)
IMM GRANULOCYTES NFR BLD AUTO: 0 % (ref 0–5)
LYMPHOCYTES # BLD: 2.1 K/UL (ref 0.5–4.6)
LYMPHOCYTES NFR BLD: 31 % (ref 13–44)
MCH RBC QN AUTO: 31.5 PG (ref 26.1–32.9)
MCHC RBC AUTO-ENTMCNC: 34.1 G/DL (ref 31.4–35)
MCV RBC AUTO: 92.3 FL (ref 82–102)
MONOCYTES # BLD: 0.6 K/UL (ref 0.1–1.3)
MONOCYTES NFR BLD: 9 % (ref 4–12)
NEUTS SEG # BLD: 3.8 K/UL (ref 1.7–8.2)
NEUTS SEG NFR BLD: 55 % (ref 43–78)
NRBC # BLD: 0 K/UL (ref 0–0.2)
PLATELET # BLD AUTO: 211 K/UL (ref 150–450)
PMV BLD AUTO: 9.4 FL (ref 9.4–12.3)
POTASSIUM SERPL-SCNC: 3.6 MMOL/L (ref 3.5–5.1)
PROT SERPL-MCNC: 6.9 G/DL (ref 6.3–8.2)
RBC # BLD AUTO: 4.29 M/UL (ref 4.05–5.2)
SODIUM SERPL-SCNC: 137 MMOL/L (ref 133–143)
WBC # BLD AUTO: 6.9 K/UL (ref 4.3–11.1)

## 2023-04-27 PROCEDURE — 99284 EMERGENCY DEPT VISIT MOD MDM: CPT

## 2023-04-27 PROCEDURE — 85025 COMPLETE CBC W/AUTO DIFF WBC: CPT

## 2023-04-27 PROCEDURE — 70450 CT HEAD/BRAIN W/O DYE: CPT

## 2023-04-27 PROCEDURE — 6360000002 HC RX W HCPCS: Performed by: STUDENT IN AN ORGANIZED HEALTH CARE EDUCATION/TRAINING PROGRAM

## 2023-04-27 PROCEDURE — 96374 THER/PROPH/DIAG INJ IV PUSH: CPT

## 2023-04-27 PROCEDURE — 93005 ELECTROCARDIOGRAM TRACING: CPT | Performed by: STUDENT IN AN ORGANIZED HEALTH CARE EDUCATION/TRAINING PROGRAM

## 2023-04-27 PROCEDURE — 80053 COMPREHEN METABOLIC PANEL: CPT

## 2023-04-27 RX ORDER — PROCHLORPERAZINE EDISYLATE 5 MG/ML
10 INJECTION INTRAMUSCULAR; INTRAVENOUS
Status: COMPLETED | OUTPATIENT
Start: 2023-04-27 | End: 2023-04-27

## 2023-04-27 RX ORDER — PROCHLORPERAZINE MALEATE 10 MG
10 TABLET ORAL EVERY 6 HOURS PRN
Qty: 20 TABLET | Refills: 2 | Status: SHIPPED | OUTPATIENT
Start: 2023-04-27

## 2023-04-27 RX ORDER — PROCHLORPERAZINE MALEATE 10 MG
10 TABLET ORAL EVERY 6 HOURS PRN
Qty: 20 TABLET | Refills: 2 | Status: SHIPPED | OUTPATIENT
Start: 2023-04-27 | End: 2023-04-27 | Stop reason: SDUPTHER

## 2023-04-27 RX ADMIN — PROCHLORPERAZINE EDISYLATE 10 MG: 5 INJECTION INTRAMUSCULAR; INTRAVENOUS at 21:07

## 2023-04-27 ASSESSMENT — PAIN - FUNCTIONAL ASSESSMENT
PAIN_FUNCTIONAL_ASSESSMENT: 0-10
PAIN_FUNCTIONAL_ASSESSMENT: 0-10

## 2023-04-27 ASSESSMENT — PAIN DESCRIPTION - LOCATION
LOCATION: HEAD
LOCATION: HEAD

## 2023-04-27 ASSESSMENT — PAIN SCALES - GENERAL
PAINLEVEL_OUTOF10: 8
PAINLEVEL_OUTOF10: 4
PAINLEVEL_OUTOF10: 3

## 2023-04-28 LAB
EKG ATRIAL RATE: 67 BPM
EKG DIAGNOSIS: NORMAL
EKG P AXIS: 74 DEGREES
EKG P-R INTERVAL: 207 MS
EKG Q-T INTERVAL: 423 MS
EKG QRS DURATION: 96 MS
EKG QTC CALCULATION (BAZETT): 450 MS
EKG R AXIS: 6 DEGREES
EKG T AXIS: 60 DEGREES
EKG VENTRICULAR RATE: 68 BPM

## 2023-04-28 NOTE — DISCHARGE INSTRUCTIONS
Referral Has been arranged with neurology as discussed. Continue medications as prescribed, if you do not receive a call from this office to schedule appointment, please call the number listed. Return for worsening symptoms, concerns or questions.

## 2023-04-28 NOTE — ED TRIAGE NOTES
Patient ambulatory to triage with steady gait c/o right side of her face/neck hurting as well as a headache. Also states her full tongue feels numb. Denies any dizziness/numbness to any other area. Pt states she had a stroke back in March and is worried that her symptoms feel similar to that day. Pt states that she \"feels like she just woke up and is having trouble thinking straight. \" This all started around 1500 today.

## 2023-04-28 NOTE — ED PROVIDER NOTES
Emergency Department Provider Note       PCP: LEONA Cali   Age: 74 y.o. Sex: female     DISPOSITION       No diagnosis found. Medical Decision Making     Complexity of Problems Addressed:  {Complexity:45296}    Data Reviewed and Analyzed:  Category 1:   I independently ordered and reviewed each unique test.  {external source:10007}   {Historian (state who, why needed, what they said):78070}    Category 2:   {test reviewed:87794}    Category 3: Discussion of management or test interpretation. ***  {Admitted or Consultants UOXZWGIU.:54496}    Risk of Complications and/or Morbidity of Patient Management:  {[facility]:75026}    ED Course as of 04/27/23 2130   Thu Apr 27, 2023 2047 EKG interpretation: Sinus rhythm, rate of 68, normal axis, no ischemia. [BR]      ED Course User Index  [BR] Navid Luna DO       History      Amarilys Rosario is a 74 y.o. female who presents to the Emergency Department with chief complaint of    Chief Complaint   Patient presents with    Headache      57-year-old female patient with history of previous TIA and stroke presenting this department with reports of headache and neck pain. Patient states symptoms started between 3 and 5 today. She reports aching discomfort over the right side of her head behind her eye radiating down into her neck. She denies any acute visual changes, reports no trouble speaking or swallowing. She reports a odd sensation in her tongue. She describes this not as numbness or swelling but as though the tongue is been burnt. She denies any ingestion of hot food or fluids reports no suspected injury to the area. She has had some word finding difficulty that is unchanged since her previous TIA/stroke. Patient reports history of paroxysmal atrial fibrillation, currently on Eliquis and consistent taking this medication. She denies history of frequent headaches, tension or migraine headaches. The history is provided by the patient.  No language

## 2023-04-28 NOTE — ED NOTES
I have reviewed discharge instructions with the patient. The patient verbalized understanding. Patient left ED via Discharge Method: ambulatory to Home with self  Opportunity for questions and clarification provided. Patient given 1 scripts. To continue your aftercare when you leave the hospital, you may receive an automated call from our care team to check in on how you are doing. This is a free service and part of our promise to provide the best care and service to meet your aftercare needs.  If you have questions, or wish to unsubscribe from this service please call [phone]. Thank you for Choosing our [facility] Emergency Department.        Armani Morrison RN  04/27/23 4520

## 2023-07-25 ENCOUNTER — APPOINTMENT (OUTPATIENT)
Dept: MRI IMAGING | Age: 73
End: 2023-07-25
Payer: MEDICARE

## 2023-07-25 ENCOUNTER — APPOINTMENT (OUTPATIENT)
Dept: CT IMAGING | Age: 73
End: 2023-07-25
Payer: MEDICARE

## 2023-07-25 ENCOUNTER — HOSPITAL ENCOUNTER (EMERGENCY)
Age: 73
Discharge: HOME | End: 2023-07-25
Attending: EMERGENCY MEDICINE
Payer: MEDICARE

## 2023-07-25 VITALS
DIASTOLIC BLOOD PRESSURE: 67 MMHG | HEART RATE: 68 BPM | HEIGHT: 65 IN | BODY MASS INDEX: 24.49 KG/M2 | WEIGHT: 147 LBS | RESPIRATION RATE: 18 BRPM | SYSTOLIC BLOOD PRESSURE: 135 MMHG | TEMPERATURE: 98 F | OXYGEN SATURATION: 96 %

## 2023-07-25 DIAGNOSIS — R51.9 LEFT TEMPORAL HEADACHE: Primary | ICD-10-CM

## 2023-07-25 DIAGNOSIS — R29.898 RIGHT HAND WEAKNESS: ICD-10-CM

## 2023-07-25 LAB
ALBUMIN SERPL-MCNC: 3.8 G/DL (ref 3.2–4.6)
ALBUMIN/GLOB SERPL: 1 (ref 0.4–1.6)
ALP SERPL-CCNC: 120 U/L (ref 50–136)
ALT SERPL-CCNC: 33 U/L (ref 12–65)
ANION GAP SERPL CALC-SCNC: 7 MMOL/L (ref 2–11)
AST SERPL-CCNC: 31 U/L (ref 15–37)
BASOPHILS # BLD: 0 K/UL (ref 0–0.2)
BASOPHILS NFR BLD: 1 % (ref 0–2)
BILIRUB SERPL-MCNC: 2.1 MG/DL (ref 0.2–1.1)
BUN SERPL-MCNC: 14 MG/DL (ref 8–23)
CALCIUM SERPL-MCNC: 9 MG/DL (ref 8.3–10.4)
CHLORIDE SERPL-SCNC: 106 MMOL/L (ref 101–110)
CO2 SERPL-SCNC: 26 MMOL/L (ref 21–32)
CREAT SERPL-MCNC: 0.81 MG/DL (ref 0.6–1)
DIFFERENTIAL METHOD BLD: NORMAL
EKG ATRIAL RATE: 68 BPM
EKG DIAGNOSIS: NORMAL
EKG P AXIS: 19 DEGREES
EKG P-R INTERVAL: 181 MS
EKG Q-T INTERVAL: 412 MS
EKG QRS DURATION: 91 MS
EKG QTC CALCULATION (BAZETT): 445 MS
EKG R AXIS: -12 DEGREES
EKG T AXIS: 29 DEGREES
EKG VENTRICULAR RATE: 70 BPM
EOSINOPHIL # BLD: 0.1 K/UL (ref 0–0.8)
EOSINOPHIL NFR BLD: 2 % (ref 0.5–7.8)
ERYTHROCYTE [DISTWIDTH] IN BLOOD BY AUTOMATED COUNT: 12.3 % (ref 11.9–14.6)
GLOBULIN SER CALC-MCNC: 3.7 G/DL (ref 2.8–4.5)
GLUCOSE BLD STRIP.AUTO-MCNC: 88 MG/DL (ref 65–100)
GLUCOSE SERPL-MCNC: 90 MG/DL (ref 65–100)
HCT VFR BLD AUTO: 43.5 % (ref 35.8–46.3)
HGB BLD-MCNC: 14.8 G/DL (ref 11.7–15.4)
IMM GRANULOCYTES # BLD AUTO: 0 K/UL (ref 0–0.5)
IMM GRANULOCYTES NFR BLD AUTO: 0 % (ref 0–5)
INR PPP: 1.2
LYMPHOCYTES # BLD: 1.7 K/UL (ref 0.5–4.6)
LYMPHOCYTES NFR BLD: 27 % (ref 13–44)
MCH RBC QN AUTO: 31.4 PG (ref 26.1–32.9)
MCHC RBC AUTO-ENTMCNC: 34 G/DL (ref 31.4–35)
MCV RBC AUTO: 92.2 FL (ref 82–102)
MONOCYTES # BLD: 0.5 K/UL (ref 0.1–1.3)
MONOCYTES NFR BLD: 8 % (ref 4–12)
NEUTS SEG # BLD: 3.9 K/UL (ref 1.7–8.2)
NEUTS SEG NFR BLD: 62 % (ref 43–78)
NRBC # BLD: 0 K/UL (ref 0–0.2)
PLATELET # BLD AUTO: 198 K/UL (ref 150–450)
PMV BLD AUTO: 9.9 FL (ref 9.4–12.3)
POTASSIUM SERPL-SCNC: 3.7 MMOL/L (ref 3.5–5.1)
PROT SERPL-MCNC: 7.5 G/DL (ref 6.3–8.2)
PROTHROMBIN TIME: 15.5 SEC (ref 12.6–14.3)
RBC # BLD AUTO: 4.72 M/UL (ref 4.05–5.2)
SERVICE CMNT-IMP: NORMAL
SODIUM SERPL-SCNC: 139 MMOL/L (ref 133–143)
TROPONIN I SERPL HS-MCNC: 5.9 PG/ML (ref 0–14)
WBC # BLD AUTO: 6.3 K/UL (ref 4.3–11.1)

## 2023-07-25 PROCEDURE — 70547 MR ANGIOGRAPHY NECK W/O DYE: CPT

## 2023-07-25 PROCEDURE — 84484 ASSAY OF TROPONIN QUANT: CPT

## 2023-07-25 PROCEDURE — 93010 ELECTROCARDIOGRAM REPORT: CPT | Performed by: INTERNAL MEDICINE

## 2023-07-25 PROCEDURE — 85025 COMPLETE CBC W/AUTO DIFF WBC: CPT

## 2023-07-25 PROCEDURE — 82962 GLUCOSE BLOOD TEST: CPT

## 2023-07-25 PROCEDURE — 93005 ELECTROCARDIOGRAM TRACING: CPT | Performed by: EMERGENCY MEDICINE

## 2023-07-25 PROCEDURE — 99284 EMERGENCY DEPT VISIT MOD MDM: CPT

## 2023-07-25 PROCEDURE — 85610 PROTHROMBIN TIME: CPT

## 2023-07-25 PROCEDURE — 70544 MR ANGIOGRAPHY HEAD W/O DYE: CPT

## 2023-07-25 PROCEDURE — 70551 MRI BRAIN STEM W/O DYE: CPT

## 2023-07-25 PROCEDURE — 80053 COMPREHEN METABOLIC PANEL: CPT

## 2023-07-25 PROCEDURE — 99222 1ST HOSP IP/OBS MODERATE 55: CPT | Performed by: STUDENT IN AN ORGANIZED HEALTH CARE EDUCATION/TRAINING PROGRAM

## 2023-07-25 RX ORDER — ROSUVASTATIN CALCIUM 20 MG/1
20 TABLET, COATED ORAL NIGHTLY
Status: DISCONTINUED | OUTPATIENT
Start: 2023-07-25 | End: 2023-07-25 | Stop reason: HOSPADM

## 2023-07-25 ASSESSMENT — ENCOUNTER SYMPTOMS
VOMITING: 0
BACK PAIN: 0
ABDOMINAL PAIN: 0
COUGH: 0
SHORTNESS OF BREATH: 0

## 2023-07-25 ASSESSMENT — PAIN SCALES - GENERAL: PAINLEVEL_OUTOF10: 0

## 2023-07-25 ASSESSMENT — PAIN - FUNCTIONAL ASSESSMENT: PAIN_FUNCTIONAL_ASSESSMENT: 0-10

## 2023-07-25 NOTE — ED TRIAGE NOTES
Patient ambulatory to triage. Patient c\o R hand numbness that occurred at 5:30 last night. Patient now c\o headache, was referred by her PCP.

## 2023-07-25 NOTE — ED PROVIDER NOTES
Emergency Department Provider Note       PCP: LEONA Hernandez   Age: 74 y.o. Sex: female     [address]    1. Left temporal headache  R51.9       2. Right hand weakness  R29.898     Resolved          Medical Decision Making   Headache in patient who is on anticoagulants. Also some stroke symptoms regarding transient weakness and numbness in right upper extremity. Head CT. Screening lab work. Neurologic consultation. Complexity of Problems Addressed:  1 or more acute illnesses that pose a threat to life or bodily function. Data Reviewed and Analyzed:  Category 1:   I independently ordered and reviewed each unique test.  I reviewed external records: provider visit note from PCP. I reviewed external records: provider visit note from outside specialist.  I reviewed external records: previous imaging study including radiologist interpretation. MRI report from March of this year where patient had old cerebellar stroke but no new stroke. Discharge summary from hospitalist for that admission regarding new onset atrial fibrillation and patient placed on metoprolol and Eliquis. Suspected TIA. Primary care doctor notes regarding remote history of breast cancer    Category 2:   I independently ordered and interpreted the ED EKG in the absence of a Cardiologist.    Rate: 70  EKG Interpretation: EKG Interpretation: sinus rhythm, no evidence of arrhythmia  ST Segments: Normal ST segments - NO STEMI. Occasional PACs      Category 3: Discussion of management or test interpretation. Neurology saw patient and evidently has recommended MRI. As this can be done this afternoon, will cancel CT. The management of this patient was discussed with an external consultant. Risk of Complications and/or Morbidity of Patient Management:  Prescription drug management performed. Discussion with external consultants.          Is this patient to be included in the SEP-1 core measure due to severe

## 2023-07-25 NOTE — ED PROVIDER NOTES
Emergency Department Provider Signout / Continuation of Care Note         DISPOSITION Decision To Discharge 07/25/2023 04:38:10 PM       ICD-10-CM    1. Left temporal headache  R51.9       2. Right hand weakness  R29.898 Ambulatory referral to Neurology    Resolved          The patient's care was signed out to me at shift change. Final Plan      I assumed care on this patient secondary to shift change at 4 PM.  See Dr. Clau Gutierrez note for complete history and physical.  MRI was pending at the time of shift change. MRI resulted with no significant findings on the MRI as well as MRA. No significant stenosis either. Patient with resolved symptoms. Neurology recommendations patient to be discharged home with outpatient follow-up pending normal MRI. Will discharge home with outpatient PCP follow-up. Given strict return precautions. Patient voiced understanding and agreement. I did speak with Dr. Isabella Ellis, neurology at 5:10 PM and confirmed that after MRIs were normal the patient was to be discharged home with outpatient follow-up.      Shalom Estrada, DO  07/25/23 [address] [facility] Patricia, DO  07/25/23 1789

## 2023-07-25 NOTE — CARE COORDINATION
SW met with patient who confirmed demographic information, states that she lives alone and has a daughter who lives in [facility]. Patient is insured and established with primary care. Patient does not use assistive devices to ambulate, denies any falls, and is independent in her ADLs. Patient feels at her baseline in terms of strength and balance. Dispo pending. No needs identified from [facility] at this time. Will continue to follow ER work up. ASSESSMENT NOTE    Attending Physician: Josee Fuentes MD  Admit Problem: No admission diagnoses are documented for this encounter.   Date/Time of Admission: 7/25/2023 12:47 PM  Problem List:  Patient Active Problem List   Diagnosis    Sensory neuropathy    Scalp itch    History of breast cancer in female    Mammogram abnormal    Rhinitis    Bronchitis    Vitamin D deficiency    Obstructive sleep apnea    Deviated nasal septum    Encounter for medication management    Hypertrophy of nasal turbinates    Weight gain    Anxiety    Chronic sinusitis    Mixed hyperlipidemia    Nocturnal hypoxemia    GERD (gastroesophageal reflux disease)    Abnormal mammogram    Asthma    Thyroid nodule    Thigh numbness    Scalp pain    Malignant neoplasm of upper-outer quadrant of left breast in female, estrogen receptor positive (HCC)    Neuropathy    Allergic rhinitis    Neuropathic pain    Pain of right hip joint    Paroxysmal atrial fibrillation (HCC)    A-fib ([address])    H/O: CVA (cerebrovascular accident)       Service Assessment  Patient Orientation Alert and Oriented, Person, Place, Situation, Self   Cognition Alert   History Provided By Patient   Primary Caregiver Self   Accompanied By/Relationship     Support Systems Children, Friends/Neighbors   Patient's Healthcare Decision Maker is:     PCP Verified by CM Yes   Last Visit to PCP     Prior Functional Level Independent in ADLs/IADLs   Current Functional Level Independent in ADLs/IADLs   Can patient return to prior living arrangement Yes

## 2023-07-25 NOTE — DISCHARGE INSTRUCTIONS
Continue take your medications as prescribed. Follow-up with your primary care physician in 2 to 3 days. Return to the ER for worsening or worrisome symptoms.

## 2023-10-26 ENCOUNTER — OFFICE VISIT (OUTPATIENT)
Age: 73
End: 2023-10-26
Payer: MEDICARE

## 2023-10-26 VITALS
HEIGHT: 65 IN | HEART RATE: 76 BPM | BODY MASS INDEX: 25.62 KG/M2 | DIASTOLIC BLOOD PRESSURE: 74 MMHG | WEIGHT: 153.8 LBS | SYSTOLIC BLOOD PRESSURE: 120 MMHG

## 2023-10-26 DIAGNOSIS — Z79.01 CHRONIC ANTICOAGULATION: ICD-10-CM

## 2023-10-26 DIAGNOSIS — I48.0 PAROXYSMAL ATRIAL FIBRILLATION (HCC): Primary | ICD-10-CM

## 2023-10-26 DIAGNOSIS — E78.01 FAMILIAL HYPERCHOLESTEROLEMIA: ICD-10-CM

## 2023-10-26 DIAGNOSIS — Z86.73 H/O: CVA (CEREBROVASCULAR ACCIDENT): ICD-10-CM

## 2023-10-26 PROCEDURE — G8419 CALC BMI OUT NRM PARAM NOF/U: HCPCS | Performed by: INTERNAL MEDICINE

## 2023-10-26 PROCEDURE — 1090F PRES/ABSN URINE INCON ASSESS: CPT | Performed by: INTERNAL MEDICINE

## 2023-10-26 PROCEDURE — G8399 PT W/DXA RESULTS DOCUMENT: HCPCS | Performed by: INTERNAL MEDICINE

## 2023-10-26 PROCEDURE — 1123F ACP DISCUSS/DSCN MKR DOCD: CPT | Performed by: INTERNAL MEDICINE

## 2023-10-26 PROCEDURE — 99214 OFFICE O/P EST MOD 30 MIN: CPT | Performed by: INTERNAL MEDICINE

## 2023-10-26 PROCEDURE — G8484 FLU IMMUNIZE NO ADMIN: HCPCS | Performed by: INTERNAL MEDICINE

## 2023-10-26 PROCEDURE — 3017F COLORECTAL CA SCREEN DOC REV: CPT | Performed by: INTERNAL MEDICINE

## 2023-10-26 PROCEDURE — 1036F TOBACCO NON-USER: CPT | Performed by: INTERNAL MEDICINE

## 2023-10-26 PROCEDURE — G8427 DOCREV CUR MEDS BY ELIG CLIN: HCPCS | Performed by: INTERNAL MEDICINE

## 2023-10-26 RX ORDER — METOPROLOL SUCCINATE 25 MG/1
12.5 CAPSULE, EXTENDED RELEASE ORAL 2 TIMES DAILY
Qty: 30 CAPSULE | Status: SHIPPED | COMMUNITY
Start: 2023-10-26

## 2023-10-26 ASSESSMENT — ENCOUNTER SYMPTOMS
HOARSE VOICE: 0
HEMATEMESIS: 0
WHEEZING: 0
HEMATOCHEZIA: 0
EYE REDNESS: 0
DOUBLE VISION: 0
ABDOMINAL PAIN: 0
STRIDOR: 0
HEMOPTYSIS: 0

## 2023-10-26 NOTE — PROGRESS NOTES
[facility] CARDIOLOGY  [address] [facility] [address]  PHONE: [phone]          10/26/23    NAME:  Prasanth Killian  : 1950  MRN: [id number]         SUBJECTIVE:   Prasanth Killian is a 74 y.o. female seen for a visit regarding the following:     Chief Complaint   Patient presents with    Atrial Fibrillation    Hyperlipidemia           HPI:    Cardio problem list:  1. Paroxysmal atrial [id number] score of 5-on metoprolol and Eliquis-diagnosed 2023-[facility] admission  2. Left cerebellar hemisphere CVA-initial event around 2022, repeat event around January 15 and a third event in 2023-admitted to [place]  3. Hyperlipidemia  4. GERD  5. Asthma  6. FAISAL- on mandibular advancement device    I saw Ms. Hale was a pleasant 70-year-old woman in cardiovascular follow-up for paroxysmal atrial fibrillation, history of prior CVA involving left cerebellar hemisphere, hyperlipidemia, known GERD and asthma. We last met with her 6 months ago at which time no changes were made with her medical therapy    Since we last met with her, she has had no further episodes of palpitations or presyncope or syncope or TIAs or strokelike symptoms. Remains on Eliquis for thromboembolic prophylaxis but no excessive bleeding or bruising. Remains on metoprolol succinate and denies any significant palpitations in any way. -Her primary had cut back her dose but she really needs to be on full dose Eliquis at her age and weight. Hyperlipidemia-remains on high intensity statin therapy with no significant myalgias. Stopped for rosuvastatin.-had stopped it briefly because of swelling in her right 2 toes-she understands the need to get back onto it    Obstructive sleep apnea-on mandibular advancement device and using this regularly. Important to continue using this especially with history of A-fib.       Past Medical History, Past Surgical History, Family history,

## 2024-04-22 ENCOUNTER — OFFICE VISIT (OUTPATIENT)
Age: 74
End: 2024-04-22
Payer: MEDICARE

## 2024-04-22 VITALS
DIASTOLIC BLOOD PRESSURE: 80 MMHG | SYSTOLIC BLOOD PRESSURE: 120 MMHG | HEIGHT: 65 IN | BODY MASS INDEX: 26.49 KG/M2 | HEART RATE: 70 BPM | WEIGHT: 159 LBS

## 2024-04-22 DIAGNOSIS — E78.01 FAMILIAL HYPERCHOLESTEROLEMIA: ICD-10-CM

## 2024-04-22 DIAGNOSIS — I48.0 PAROXYSMAL ATRIAL FIBRILLATION (HCC): Primary | ICD-10-CM

## 2024-04-22 DIAGNOSIS — Z86.73 H/O: CVA (CEREBROVASCULAR ACCIDENT): ICD-10-CM

## 2024-04-22 DIAGNOSIS — Z79.01 CHRONIC ANTICOAGULATION: ICD-10-CM

## 2024-04-22 PROCEDURE — 1036F TOBACCO NON-USER: CPT | Performed by: INTERNAL MEDICINE

## 2024-04-22 PROCEDURE — G8427 DOCREV CUR MEDS BY ELIG CLIN: HCPCS | Performed by: INTERNAL MEDICINE

## 2024-04-22 PROCEDURE — 1123F ACP DISCUSS/DSCN MKR DOCD: CPT | Performed by: INTERNAL MEDICINE

## 2024-04-22 PROCEDURE — 1090F PRES/ABSN URINE INCON ASSESS: CPT | Performed by: INTERNAL MEDICINE

## 2024-04-22 PROCEDURE — 99214 OFFICE O/P EST MOD 30 MIN: CPT | Performed by: INTERNAL MEDICINE

## 2024-04-22 PROCEDURE — G8399 PT W/DXA RESULTS DOCUMENT: HCPCS | Performed by: INTERNAL MEDICINE

## 2024-04-22 PROCEDURE — G8419 CALC BMI OUT NRM PARAM NOF/U: HCPCS | Performed by: INTERNAL MEDICINE

## 2024-04-22 RX ORDER — CITALOPRAM HYDROBROMIDE 10 MG/1
10 TABLET ORAL DAILY
COMMUNITY
Start: 2024-03-14

## 2024-04-22 RX ORDER — ROSUVASTATIN CALCIUM 40 MG/1
40 TABLET, COATED ORAL DAILY
Qty: 90 TABLET | Refills: 3 | Status: SHIPPED | OUTPATIENT
Start: 2024-04-22

## 2024-04-22 ASSESSMENT — ENCOUNTER SYMPTOMS
HEMATOCHEZIA: 0
STRIDOR: 0
HEMOPTYSIS: 0
EYE REDNESS: 0
HOARSE VOICE: 0
ABDOMINAL PAIN: 0
HEMATEMESIS: 0
DOUBLE VISION: 0
WHEEZING: 0

## 2024-04-22 NOTE — PROGRESS NOTES
estimated EF of 55 - 60%. Left ventricle size is normal. Normal wall thickness. Normal wall motion. Normal diastolic function.    Aortic Valve: Mild sclerosis of the aortic valve cusp.    Mitral Valve: Mild regurgitation.    Tricuspid Valve: Mild regurgitation. Mildly elevated RVSP. The estimated RVSP is 31 mmHg.    Interatrial Septum: Agitated saline study was negative with and without provocation.    Technical qualifiers: Color flow Doppler was performed and pulse wave and/or continuous wave Doppler was performed.    Signed by: Kiel Lakhani MD on 3/1/2023  9:26 AM, Signed by: Unknown Provider Result on 3/1/2023 12:00 AM     Lab Results   Component Value Date    HGB 14.8 07/25/2023      Lab Results   Component Value Date     07/25/2023        ASSESSMENT and PLAN    Paroxysmal atrial fibrillation (HCC)  -Maintaining sinus rhythm on metoprolol succinate 25 mg once daily and  Eliquis for thromboembolic prophylaxis-continue    H/O: CVA (cerebrovascular accident)  - [id number] score of 5-continue Eliquis for thromboembolic prophylaxis    Familial hypercholesterolemia  LDL has been as high as 215-continue rosuvastatin therapy-high intensity statin therapy especially with previous CVA  -Increased rosuvastatin to 40 mg daily for better plaque stabilization score prevention.  Counseled on importance of following a low-carb diet especially with fatty liver.    Chronic anticoagulation  Continue Eliquis for thromboembolic prophylaxis.       Overall Impression  The only change made with her medical therapy was to increase rosuvastatin to 40 mg daily to bring down her lipids to a more acceptable range.  [id number] score of 5-indication for continued therapeutic anticoagulation with Eliquis at this point.  Remains in sinus rhythm on just metoprolol.  Counseled on importance of following a low-carb diet.    Return in about 6 months (around 10/22/2024) for afib, hld.     Thank you for allowing us to participate

## 2024-05-10 ENCOUNTER — TRANSCRIBE ORDERS (OUTPATIENT)
Dept: SCHEDULING | Age: 74
End: 2024-05-10

## 2024-05-10 DIAGNOSIS — Z12.31 SCREENING MAMMOGRAM FOR HIGH-RISK PATIENT: Primary | ICD-10-CM

## 2024-05-11 ENCOUNTER — HOSPITAL ENCOUNTER (OUTPATIENT)
Dept: MAMMOGRAPHY | Age: 74
End: 2024-05-11
Payer: MEDICARE

## 2024-05-11 DIAGNOSIS — Z12.31 SCREENING MAMMOGRAM FOR HIGH-RISK PATIENT: ICD-10-CM

## 2024-05-11 PROCEDURE — 77063 BREAST TOMOSYNTHESIS BI: CPT

## 2024-06-06 ENCOUNTER — OFFICE VISIT (OUTPATIENT)
Dept: NEUROLOGY | Age: 74
End: 2024-06-06
Payer: MEDICARE

## 2024-06-06 VITALS
SYSTOLIC BLOOD PRESSURE: 135 MMHG | HEIGHT: 65 IN | BODY MASS INDEX: 25.83 KG/M2 | OXYGEN SATURATION: 96 % | HEART RATE: 62 BPM | DIASTOLIC BLOOD PRESSURE: 84 MMHG | WEIGHT: 155 LBS

## 2024-06-06 DIAGNOSIS — G45.9 TIA (TRANSIENT ISCHEMIC ATTACK): ICD-10-CM

## 2024-06-06 DIAGNOSIS — I63.9 CEREBELLAR STROKE (HCC): Primary | ICD-10-CM

## 2024-06-06 PROCEDURE — G8419 CALC BMI OUT NRM PARAM NOF/U: HCPCS | Performed by: PSYCHIATRY & NEUROLOGY

## 2024-06-06 PROCEDURE — G8399 PT W/DXA RESULTS DOCUMENT: HCPCS | Performed by: PSYCHIATRY & NEUROLOGY

## 2024-06-06 PROCEDURE — 99204 OFFICE O/P NEW MOD 45 MIN: CPT | Performed by: PSYCHIATRY & NEUROLOGY

## 2024-06-06 PROCEDURE — 1036F TOBACCO NON-USER: CPT | Performed by: PSYCHIATRY & NEUROLOGY

## 2024-06-06 PROCEDURE — 1090F PRES/ABSN URINE INCON ASSESS: CPT | Performed by: PSYCHIATRY & NEUROLOGY

## 2024-06-06 PROCEDURE — 1123F ACP DISCUSS/DSCN MKR DOCD: CPT | Performed by: PSYCHIATRY & NEUROLOGY

## 2024-06-06 PROCEDURE — G8427 DOCREV CUR MEDS BY ELIG CLIN: HCPCS | Performed by: PSYCHIATRY & NEUROLOGY

## 2024-06-06 ASSESSMENT — ENCOUNTER SYMPTOMS
ALLERGIC/IMMUNOLOGIC NEGATIVE: 1
EYES NEGATIVE: 1
RESPIRATORY NEGATIVE: 1

## 2024-06-06 NOTE — PROGRESS NOTES
[facility] NEUROLOGY  [address], [address]  [place] [address]  Phone: [phone] Fax [phone]  Dr. Blake Jamil      6/6/2024  Patricia Hale     Patient is referred by the following provider for consultation regarding as below:       I reviewed the available records and notes and have examined patient with the following findings:     Chief Complaint:  Chief Complaint   Patient presents with    New Patient     Hx of stroke & TIA. Last TIA on Mothers Day 2024. She states she has vomiting with the TIA.  Her acupuncturist recd \"Spirit Calm\" herbal supplement and NAC 600mg           HPI: This is a right handed 74 y.o. Single female who is very pleasant very appropriate patient unfortunately in 2022 she did and was diagnosed with A-fib and placed on Eliquis.  In November 2022 she had a violent bout of vomiting dizzy and off balance ataxia and memory problems.  She did go to the hospital.  But his stroke was not worked up at that time.  It was not until July 2023 last year she had another episode of violent vomiting off balance went to the hospital they did MRIs of the brain that clearly showed in the left cerebellar old stroke it was not acute.  An MRA of the brain which was done was normal.  At that point they continued her on blood pressure medicines Crestor and Eliquis.  She is what she is on at the moment for stroke prevention.  But this recent Mother's Day only a few weeks ago she had similar symptoms with dizziness off balance and vertigo and nausea very similar to the January 2023 and the November 2022 episodes.  She got very fearful of course.  But not go to the emergency room.  Her primary doctor and referred her here.  Of course her biggest concern was on Mother's Day was that a TIA could then been another stroke that resolved and left some lesions in the brain.  We will not know until we look.  At the moment she is doing very well and stable back to her normal baseline.  There is really

## 2024-06-13 ENCOUNTER — HOSPITAL ENCOUNTER (OUTPATIENT)
Dept: CT IMAGING | Age: 74
Discharge: HOME | End: 2024-06-13
Attending: PSYCHIATRY & NEUROLOGY
Payer: MEDICARE

## 2024-06-13 DIAGNOSIS — G45.9 TIA (TRANSIENT ISCHEMIC ATTACK): ICD-10-CM

## 2024-06-13 DIAGNOSIS — I63.9 CEREBELLAR STROKE (HCC): ICD-10-CM

## 2024-06-13 LAB — CREAT BLD-MCNC: 0.72 MG/DL (ref 0.8–1.5)

## 2024-06-13 PROCEDURE — 70496 CT ANGIOGRAPHY HEAD: CPT | Performed by: RADIOLOGY

## 2024-06-13 PROCEDURE — 82565 ASSAY OF CREATININE: CPT

## 2024-06-13 PROCEDURE — 6360000004 HC RX CONTRAST MEDICATION: Performed by: PSYCHIATRY & NEUROLOGY

## 2024-06-13 PROCEDURE — 70496 CT ANGIOGRAPHY HEAD: CPT

## 2024-06-13 PROCEDURE — 70498 CT ANGIOGRAPHY NECK: CPT | Performed by: RADIOLOGY

## 2024-06-13 RX ADMIN — IOPAMIDOL 60 ML: 755 INJECTION, SOLUTION INTRAVENOUS at 08:37

## 2024-06-18 ENCOUNTER — OFFICE VISIT (OUTPATIENT)
Age: 74
End: 2024-06-18
Payer: MEDICARE

## 2024-06-18 VITALS
SYSTOLIC BLOOD PRESSURE: 120 MMHG | WEIGHT: 156 LBS | HEART RATE: 86 BPM | HEIGHT: 65 IN | DIASTOLIC BLOOD PRESSURE: 72 MMHG | BODY MASS INDEX: 25.99 KG/M2

## 2024-06-18 DIAGNOSIS — I48.0 PAROXYSMAL ATRIAL FIBRILLATION (HCC): Primary | ICD-10-CM

## 2024-06-18 DIAGNOSIS — Z86.73 H/O: CVA (CEREBROVASCULAR ACCIDENT): ICD-10-CM

## 2024-06-18 DIAGNOSIS — E78.01 FAMILIAL HYPERCHOLESTEROLEMIA: ICD-10-CM

## 2024-06-18 DIAGNOSIS — Z79.01 CHRONIC ANTICOAGULATION: ICD-10-CM

## 2024-06-18 PROCEDURE — 99214 OFFICE O/P EST MOD 30 MIN: CPT | Performed by: INTERNAL MEDICINE

## 2024-06-18 PROCEDURE — 1123F ACP DISCUSS/DSCN MKR DOCD: CPT | Performed by: INTERNAL MEDICINE

## 2024-06-18 PROCEDURE — G8419 CALC BMI OUT NRM PARAM NOF/U: HCPCS | Performed by: INTERNAL MEDICINE

## 2024-06-18 PROCEDURE — G8427 DOCREV CUR MEDS BY ELIG CLIN: HCPCS | Performed by: INTERNAL MEDICINE

## 2024-06-18 PROCEDURE — 1036F TOBACCO NON-USER: CPT | Performed by: INTERNAL MEDICINE

## 2024-06-18 PROCEDURE — G8399 PT W/DXA RESULTS DOCUMENT: HCPCS | Performed by: INTERNAL MEDICINE

## 2024-06-18 PROCEDURE — 1090F PRES/ABSN URINE INCON ASSESS: CPT | Performed by: INTERNAL MEDICINE

## 2024-06-18 PROCEDURE — 93000 ELECTROCARDIOGRAM COMPLETE: CPT | Performed by: INTERNAL MEDICINE

## 2024-06-18 RX ORDER — METOPROLOL SUCCINATE 25 MG/1
25 TABLET, EXTENDED RELEASE ORAL DAILY
Qty: 90 TABLET | Refills: 3 | Status: SHIPPED | OUTPATIENT
Start: 2024-06-18

## 2024-06-18 RX ORDER — CICLESONIDE 160 UG/1
AEROSOL, METERED RESPIRATORY (INHALATION)
COMMUNITY
Start: 2024-04-29

## 2024-06-18 NOTE — PATIENT INSTRUCTIONS
Take your metoprolol every evening instead of every morning-it might help you get a little more energy during the day

## 2024-06-18 NOTE — PROGRESS NOTES
Psychiatric/Behavioral:  Negative for altered mental status and suicidal ideas.    Allergic/Immunologic: Negative for hives.       PHYSICAL EXAM:    /72   Pulse 86   Ht 1.651 m (5' 5\")   Wt 70.8 kg (156 lb)   BMI 25.96 kg/m²      Physical Exam    General: Alert and oriented in no acute distress  HEENT: Head is normocephalic, atraumatic, pupils are equal bilaterally, throat appears to be clear  Neck: No significant jugular venous distention no cervical bruits  Cardiovascular: S1 and S2 heard, regular rate and rhythm, no significant murmurs rubs or gallops.   Respiratory: Clear to auscultation bilaterally with no adventitious sounds, respirations are normal  Abdomen: Soft, nontender, nondistended, bowel sounds present.  Extremities: No cyanosis clubbing or edema  Peripheral pulses: Bilateral radial artery pulses are palpated.  Bilateral pedal pulses are well felt.  Neuro: No facial droop and no gross focal motor deficits  Lymphatic: No significant cervical lymphadenopathy noted.  Musculoskeletal: No significant redness or swelling noted in all exposed joints.  Skin: No significant rashes noted the of the exposed regions.       Medical problems and test results were reviewed with the patient today.     Recent Results (from the past 672 hour(s))   POCT Creatinine - BLOOD    Collection Time: 06/13/24  8:28 AM   Result Value Ref Range    POC Creatinine 0.72 (L) 0.8 - 1.5 mg/dL    eGFR, POC 87 >60 ml/min/1.73m2     Lab Results   Component Value Date/Time    CHOL 241 03/01/2023 05:40 AM    HDL 59 03/01/2023 05:40 AM     03/01/2023 05:40 AM    VLDL 13 03/01/2023 05:40 AM   ,hemoglobin, basic metabolic panel,   Lab Results   Component Value Date/Time    TSH 0.807 06/10/2020 02:00 PM    ,  Lab Results   Component Value Date/Time     07/25/2023 01:04 PM    K 3.7 07/25/2023 01:04 PM     07/25/2023 01:04 PM    CO2 26 07/25/2023 01:04 PM    BUN 14 07/25/2023 01:04 PM    GFRAA 95 06/10/2020 08:16 AM

## 2024-07-26 ENCOUNTER — OFFICE VISIT (OUTPATIENT)
Dept: NEUROLOGY | Age: 74
End: 2024-07-26
Payer: MEDICARE

## 2024-07-26 DIAGNOSIS — I63.9 CEREBELLAR STROKE (HCC): Primary | ICD-10-CM

## 2024-07-26 DIAGNOSIS — G47.33 OSA (OBSTRUCTIVE SLEEP APNEA): ICD-10-CM

## 2024-07-26 PROCEDURE — G8427 DOCREV CUR MEDS BY ELIG CLIN: HCPCS | Performed by: PSYCHIATRY & NEUROLOGY

## 2024-07-26 PROCEDURE — 1123F ACP DISCUSS/DSCN MKR DOCD: CPT | Performed by: PSYCHIATRY & NEUROLOGY

## 2024-07-26 PROCEDURE — 1036F TOBACCO NON-USER: CPT | Performed by: PSYCHIATRY & NEUROLOGY

## 2024-07-26 PROCEDURE — 99214 OFFICE O/P EST MOD 30 MIN: CPT | Performed by: PSYCHIATRY & NEUROLOGY

## 2024-07-26 PROCEDURE — G8419 CALC BMI OUT NRM PARAM NOF/U: HCPCS | Performed by: PSYCHIATRY & NEUROLOGY

## 2024-07-26 PROCEDURE — 1090F PRES/ABSN URINE INCON ASSESS: CPT | Performed by: PSYCHIATRY & NEUROLOGY

## 2024-07-26 PROCEDURE — G8399 PT W/DXA RESULTS DOCUMENT: HCPCS | Performed by: PSYCHIATRY & NEUROLOGY

## 2024-07-26 ASSESSMENT — ENCOUNTER SYMPTOMS
EYES NEGATIVE: 1
GASTROINTESTINAL NEGATIVE: 1
RESPIRATORY NEGATIVE: 1

## 2024-07-26 NOTE — PROGRESS NOTES
[facility] NEUROLOGY  [address] [place] [address], [address]  [place] [address]  Phone: [phone] Fax [phone]  Dr. Blake Jamil      7/26/2024  Patricia Hale     Patient is referred by the following provider for consultation regarding as below:       I reviewed the available records and notes and have examined patient with the following findings:     Chief Complaint:  No chief complaint on file.         HPI: This is a right handed 74 y.o. Single female who is very pleasant very appropriate patient who unfortunately November 2022 she was diagnosed with A-fib and placed on Eliquis at that same time she had an abrupt onset of violent vomiting dizziness off balance ataxia she went to the hospital but they did not do a stroke workup.  Then in July 2023 she went back in the hospital for similar symptoms MRI of the brain did show a left cerebellar stroke.  And that certainly could have explain the symptoms in November 2022.  We also did an MRA in July 2023 that was normal she was placed on Crestor Eliquis and blood pressure medication she did very well but a few weeks prior to my last evaluation she had her third episode and that is when we redid an MRI of her brain that did not show any acute stroke and we redid the CTA of the head and neck which was normal.  We believe this may be a TIA but she is on Eliquis Crestor and blood pressure medicine so we did not change anything.    She has a history of being very fatigued and exhausted.  Wakes up tired just as if she went to bed.  It took me a while but was able to figure out that she was previously diagnosed with obstructive sleep apnea she did previously try CPAP and failed she has an oral appliance which she used for a while but no longer is using it but she is willing to retry it.    IMAGING REVIEW:  I REVIEWED PERTINENT  IMAGES AND REPORTS WITH THE PATIENT PERSONALLY, DIRECTLY AND FULLY.     Past Medical History:  Past Medical History:   Diagnosis Date

## 2024-10-22 ENCOUNTER — OFFICE VISIT (OUTPATIENT)
Age: 74
End: 2024-10-22
Payer: MEDICARE

## 2024-10-22 VITALS
DIASTOLIC BLOOD PRESSURE: 64 MMHG | HEIGHT: 65 IN | BODY MASS INDEX: 25.83 KG/M2 | SYSTOLIC BLOOD PRESSURE: 108 MMHG | HEART RATE: 72 BPM | WEIGHT: 155 LBS

## 2024-10-22 DIAGNOSIS — E78.01 FAMILIAL HYPERCHOLESTEROLEMIA: ICD-10-CM

## 2024-10-22 DIAGNOSIS — Z79.01 CHRONIC ANTICOAGULATION: ICD-10-CM

## 2024-10-22 DIAGNOSIS — Z86.73 H/O: CVA (CEREBROVASCULAR ACCIDENT): ICD-10-CM

## 2024-10-22 DIAGNOSIS — I48.0 PAROXYSMAL ATRIAL FIBRILLATION (HCC): Primary | ICD-10-CM

## 2024-10-22 PROCEDURE — 1123F ACP DISCUSS/DSCN MKR DOCD: CPT | Performed by: INTERNAL MEDICINE

## 2024-10-22 PROCEDURE — G8399 PT W/DXA RESULTS DOCUMENT: HCPCS | Performed by: INTERNAL MEDICINE

## 2024-10-22 PROCEDURE — G8484 FLU IMMUNIZE NO ADMIN: HCPCS | Performed by: INTERNAL MEDICINE

## 2024-10-22 PROCEDURE — 1036F TOBACCO NON-USER: CPT | Performed by: INTERNAL MEDICINE

## 2024-10-22 PROCEDURE — G8419 CALC BMI OUT NRM PARAM NOF/U: HCPCS | Performed by: INTERNAL MEDICINE

## 2024-10-22 PROCEDURE — 99214 OFFICE O/P EST MOD 30 MIN: CPT | Performed by: INTERNAL MEDICINE

## 2024-10-22 PROCEDURE — G8427 DOCREV CUR MEDS BY ELIG CLIN: HCPCS | Performed by: INTERNAL MEDICINE

## 2024-10-22 PROCEDURE — 1090F PRES/ABSN URINE INCON ASSESS: CPT | Performed by: INTERNAL MEDICINE

## 2024-10-25 DIAGNOSIS — I48.0 PAROXYSMAL ATRIAL FIBRILLATION (HCC): ICD-10-CM

## 2024-10-25 DIAGNOSIS — E78.01 FAMILIAL HYPERCHOLESTEROLEMIA: ICD-10-CM

## 2024-10-25 LAB
ALBUMIN SERPL-MCNC: 3.5 G/DL (ref 3.2–4.6)
ALBUMIN/GLOB SERPL: 1.1 (ref 1–1.9)
ALP SERPL-CCNC: 110 U/L (ref 35–104)
ALT SERPL-CCNC: 21 U/L (ref 8–45)
ANION GAP SERPL CALC-SCNC: 9 MMOL/L (ref 9–18)
AST SERPL-CCNC: 24 U/L (ref 15–37)
BILIRUB SERPL-MCNC: 1.3 MG/DL (ref 0–1.2)
BUN SERPL-MCNC: 18 MG/DL (ref 8–23)
CALCIUM SERPL-MCNC: 9.4 MG/DL (ref 8.8–10.2)
CHLORIDE SERPL-SCNC: 102 MMOL/L (ref 98–107)
CHOLEST SERPL-MCNC: 174 MG/DL (ref 0–200)
CO2 SERPL-SCNC: 29 MMOL/L (ref 20–28)
CREAT SERPL-MCNC: 0.66 MG/DL (ref 0.6–1.1)
ERYTHROCYTE [DISTWIDTH] IN BLOOD BY AUTOMATED COUNT: 12.9 % (ref 11.9–14.6)
GLOBULIN SER CALC-MCNC: 3.2 G/DL (ref 2.3–3.5)
GLUCOSE SERPL-MCNC: 100 MG/DL (ref 70–99)
HCT VFR BLD AUTO: 44.1 % (ref 35.8–46.3)
HDLC SERPL-MCNC: 67 MG/DL (ref 40–60)
HDLC SERPL: 2.6 (ref 0–5)
HGB BLD-MCNC: 14.3 G/DL (ref 11.7–15.4)
LDLC SERPL CALC-MCNC: 87 MG/DL (ref 0–100)
MCH RBC QN AUTO: 30.8 PG (ref 26.1–32.9)
MCHC RBC AUTO-ENTMCNC: 32.4 G/DL (ref 31.4–35)
MCV RBC AUTO: 94.8 FL (ref 82–102)
NRBC # BLD: 0 K/UL (ref 0–0.2)
PLATELET # BLD AUTO: 188 K/UL (ref 150–450)
PMV BLD AUTO: 9.4 FL (ref 9.4–12.3)
POTASSIUM SERPL-SCNC: 4.1 MMOL/L (ref 3.5–5.1)
PROT SERPL-MCNC: 6.8 G/DL (ref 6.3–8.2)
RBC # BLD AUTO: 4.65 M/UL (ref 4.05–5.2)
SODIUM SERPL-SCNC: 140 MMOL/L (ref 136–145)
TRIGL SERPL-MCNC: 102 MG/DL (ref 0–150)
TSH W FREE THYROID IF ABNORMAL: 1.01 UIU/ML (ref 0.27–4.2)
VLDLC SERPL CALC-MCNC: 20 MG/DL (ref 6–23)
WBC # BLD AUTO: 5.5 K/UL (ref 4.3–11.1)

## 2024-11-08 ENCOUNTER — TELEPHONE (OUTPATIENT)
Age: 74
End: 2024-11-08

## 2024-11-08 NOTE — TELEPHONE ENCOUNTER
Shaq Alcaraz MD  You59 minutes ago (1:50 PM)       Cholesterol levels are substantially better-please let her know this and we can send a copy of her labs to her primary as requested.  Thanks,  AD

## 2024-11-08 NOTE — TELEPHONE ENCOUNTER
Pt would like blood test results and to know about the rosuvastatin. Also to sent those results Katarina Townsend at the [facility] on [address]. Phone: [phone]

## 2024-11-11 ENCOUNTER — TELEPHONE (OUTPATIENT)
Age: 74
End: 2024-11-11

## 2024-11-11 NOTE — TELEPHONE ENCOUNTER
----- Message from Dr. Shaq Alcaraz MD sent at 11/8/2024  1:35 PM EST -----  Please let her know that her cholesterol numbers are significantly better-total cholesterol is down to 174 from as high as 280 and LDL cholesterol down to 87 from close to 200.  Thanks,  AD    Advised patient via [employer]. Phone calls would not go through

## 2024-12-01 NOTE — PROGRESS NOTES
Hospitalist Progress Note   Admit Date:  2023 11:42 AM   Name:  Dayna Nolasco   Age:  74 y.o. Sex:  female  :  1950   MRN:  [id number]   Room:  361/    Presenting Complaint: Numbness     Reason(s) for Admission: TIA (transient ischemic attack) [G45.9]  Dizziness [R42]  Paroxysmal atrial fibrillation ([address] [I48.0]  Acute chest pain [R07.9]     Hospital Course:   74 y.o. female with medical history of breast cancer, anxiety, asthma, hyperlipidemia admitted  for dizziness. She reported onset of right facial numbness at the corner of her mouth which has resolved. CT head without acute findings. Neurology evaluated virtually in ER. Pt not TPA candidate. Symptoms have resolved. Pt found to be in afib. Has had a 2 week holter monitor on for similar complaints outpatient and was found to be in afib, pending Cardiology follow up. Subjective & 24hr Events (23):   A&O x3, remains in NSR. No CP or dizziness reported this am.   Does report seeing \"birds flying around\" last night, received ASA, statin, Eliquis last night. Ativan given earlier in day. No hallucinations this am.      Assessment & Plan:     Principal Problem:    Dizziness  Plan: MRI brain pending  Echo normal EF and diastolic function  ASA, statin ongoing    Active Problems:    Paroxysmal atrial fibrillation ([address]  Plan: Eliquis ongoing  Currently NSR  Lopressor home dose resumed  Cardiology consult pending      Hyperlipidemia  Plan: statin ongoing    Asthma  Plan: not in exacerbation    VLADIMIR:  Buspar ongoing    Gilbert syndrome:  Noted    FAISAL:  Dx 2022, patient declined CPAP and elected for mandibular advancement device option          PT/OT evals and PPD needed/ordered? Yes  Diet:  ADULT DIET;  Regular  VTE prophylaxis: Already on anticoagulation  Code status: Full Code    Hospital Problems:  Principal Problem:    Dizziness  Active Problems:    Paroxysmal atrial fibrillation (HCC)    Hyperlipidemia    GERD (gastroesophageal reflux disease)    Asthma  Resolved Problems:    * No resolved hospital problems. *      Objective:   Patient Vitals for the past 24 hrs:   Temp Pulse Resp BP SpO2   03/01/23 0740 98.1 °F (36.7 °C) 87 17 118/66 95 %   03/01/23 0258 97.3 °F (36.3 °C) 78 16 (!) 142/92 97 %   03/01/23 0017 97.5 °F (36.4 °C) 78 14 115/82 92 %   02/28/23 1935 97.7 °F (36.5 °C) 87 16 106/70 94 %   02/28/23 1543 97.7 °F (36.5 °C) 56 16 99/88 99 %   02/28/23 1535 -- 80 -- -- 98 %   02/28/23 1340 98.8 °F (37.1 °C) 78 18 -- 97 %   02/28/23 1336 -- 80 18 -- 94 %   02/28/23 1233 -- 82 18 113/80 97 %   02/28/23 1213 -- 84 22 128/75 98 %   02/28/23 1209 -- 78 19 125/88 97 %   02/28/23 1126 -- 54 18 (!) 142/96 99 %       Oxygen Therapy  SpO2: 95 %  Pulse Oximeter Device Mode: Intermittent  Pulse Oximeter Device Location: Finger  O2 Device: None (Room air)    Estimated body mass index is 24.63 kg/m² as calculated from the following:    Height as of 2/13/23: 5' 5\" (1.651 m). Weight as of 2/13/23: 148 lb (67.1 kg). No intake or output data in the 24 hours ending 03/01/23 1038      Physical Exam:   Blood pressure 118/66, pulse 87, temperature 98.1 °F (36.7 °C), temperature source Oral, resp. rate 17, SpO2 95 %. General:    Well nourished. Head:  Normocephalic, atraumatic  CV:   RRR. No m/r/g. No jugular venous distension. Lungs:   CTAB. No wheezing, rhonchi, or rales. Symmetric expansion. Abdomen:   Soft, nontender, nondistended. Extremities: No cyanosis or clubbing. No edema  Skin:     No rashes and normal coloration. Warm and dry. Neuro:  CN II-XII grossly intact. Psych:  Normal mood and affect.       I have personally reviewed labs and tests:  Recent Labs:  Recent Results (from the past 48 hour(s))   EKG 12 Lead    Collection Time: 02/28/23 11:28 AM   Result Value Ref Range    Ventricular Rate 114 BPM    Atrial Rate 138 BPM    QRS Duration 84 ms    Q-T Interval 344 ms    QTc Calculation (Bazett) 474 ms    R Axis 47 degrees    T Axis 10 degrees    Diagnosis       Atrial fibrillation with rapid ventricular response   CBC with Auto Differential    Collection Time: 02/28/23 11:36 AM   Result Value Ref Range    WBC 8.3 4.3 - 11.1 K/uL    RBC 5.05 4.05 - 5.2 M/uL    Hemoglobin 15.9 (H) 11.7 - 15.4 g/dL    Hematocrit 46.9 (H) 35.8 - 46.3 %    MCV 92.9 82.0 - 102.0 FL    MCH 31.5 26.1 - 32.9 PG    MCHC 33.9 31.4 - 35.0 g/dL    RDW 12.1 11.9 - 14.6 %    Platelets 693 910 - 455 K/uL    MPV 9.9 9.4 - 12.3 FL    nRBC 0.00 0.0 - 0.2 K/uL    Differential Type AUTOMATED      Seg Neutrophils 71 43 - 78 %    Lymphocytes 20 13 - 44 %    Monocytes 8 4.0 - 12.0 %    Eosinophils % 1 0.5 - 7.8 %    Basophils 1 0.0 - 2.0 %    Immature Granulocytes 0 0.0 - 5.0 %    Segs Absolute 5.9 1.7 - 8.2 K/UL    Absolute Lymph # 1.6 0.5 - 4.6 K/UL    Absolute Mono # 0.6 0.1 - 1.3 K/UL    Absolute Eos # 0.1 0.0 - 0.8 K/UL    Basophils Absolute 0.0 0.0 - 0.2 K/UL    Absolute Immature Granulocyte 0.0 0.0 - 0.5 K/UL   Comprehensive Metabolic Panel    Collection Time: 02/28/23 11:36 AM   Result Value Ref Range    Sodium 139 133 - 143 mmol/L    Potassium 3.9 3.5 - 5.1 mmol/L    Chloride 104 101 - 110 mmol/L    CO2 29 21 - 32 mmol/L    Anion Gap 6 2 - 11 mmol/L    Glucose 110 (H) 65 - 100 mg/dL    BUN 13 8 - 23 MG/DL    Creatinine 0.72 0.6 - 1.0 MG/DL    Est, Glom Filt Rate >60 >60 ml/min/1.73m2    Calcium 9.5 8.3 - 10.4 MG/DL    Total Bilirubin 1.3 (H) 0.2 - 1.1 MG/DL    ALT 23 12 - 65 U/L    AST 17 15 - 37 U/L    Alk Phosphatase 116 50 - 136 U/L    Total Protein 8.1 6.3 - 8.2 g/dL    Albumin 4.1 3.2 - 4.6 g/dL    Globulin 4.0 2.8 - 4.5 g/dL    Albumin/Globulin Ratio 1.0 0.4 - 1.6     Troponin    Collection Time: 02/28/23 11:36 AM   Result Value Ref Range    Troponin, High Sensitivity 8.0 0 - 14 pg/mL   POCT Glucose    Collection Time: 02/28/23 12:10 PM   Result Value Ref Range    POC Glucose 108 (H) 65 - 100 mg/dL    Performed by: James    EKG 12 Lead    Collection Time: 02/28/23  1:27 PM   Result Value Ref Range    Ventricular Rate 78 BPM    Atrial Rate 78 BPM    P-R Interval 193 ms    QRS Duration 100 ms    Q-T Interval 418 ms    QTc Calculation (Bazett) 477 ms    P Axis 57 degrees    R Axis -15 degrees    T Axis 26 degrees    Diagnosis Sinus rhythm    CBC    Collection Time: 03/01/23  5:40 AM   Result Value Ref Range    WBC 6.0 4.3 - 11.1 K/uL    RBC 4.47 4.05 - 5.2 M/uL    Hemoglobin 14.1 11.7 - 15.4 g/dL    Hematocrit 40.8 35.8 - 46.3 %    MCV 91.3 82.0 - 102.0 FL    MCH 31.5 26.1 - 32.9 PG    MCHC 34.6 31.4 - 35.0 g/dL    RDW 12.1 11.9 - 14.6 %    Platelets 217 150 - 450 K/uL    MPV 9.8 9.4 - 12.3 FL    nRBC 0.00 0.0 - 0.2 K/uL   Hemoglobin A1c    Collection Time: 03/01/23  5:40 AM   Result Value Ref Range    Hemoglobin A1C 5.5 4.8 - 5.6 %    eAG 111 mg/dL   Lipid Panel    Collection Time: 03/01/23  5:40 AM   Result Value Ref Range    Cholesterol, Total 241 (H) <200 MG/DL    Triglycerides 65 35 - 150 MG/DL    HDL 59 40 - 60 MG/DL    LDL Calculated 169 (H) <100 MG/DL    VLDL Cholesterol Calculated 13 6.0 - 23.0 MG/DL    Chol/HDL Ratio 4.1     Transthoracic echocardiogram (TTE) complete with contrast, bubble, strain, and 3D PRN    Collection Time: 03/01/23  8:35 AM   Result Value Ref Range    LV EDV A2C 84 mL    LV EDV A4C 94 mL    LV ESV A2C 35 mL    LV ESV A4C 40 mL    IVSd 1.0 (A) 0.6 - 0.9 cm    LVIDd 4.3 3.9 - 5.3 cm    LVIDs 2.8 cm    LVOT Diameter 2.0 cm    LVOT Mean Gradient 2 mmHg    LVOT VTI 16.5 cm    LVOT Peak Velocity 0.9 m/s    LVOT Peak Gradient 3 mmHg    LVPWd 0.9 0.6 - 0.9 cm    LV E' Lateral Velocity 9 cm/s    LV E' Septal Velocity 6 cm/s    LV Ejection Fraction A2C 59 %    LV Ejection Fraction A4C 58 %    EF BP 59 55 - 100 %    LVOT Area 3.1 cm2    LVOT SV 51.8 ml    LA Minor Axis 5.1 cm    LA Major [place] 4.7 cm    LA Area 2C 15.7 cm2    LA Area 4C 16.7 cm2    LA Volume 2C 40 22 - 52 mL    LA Volume 4C 48 22 - 52 mL    LA Volume BP 46  22 - 52 mL    LA Diameter 3.3 cm    AV Mean Velocity 1.1 m/s    AV Mean Gradient 5 mmHg    AV VTI 24.9 cm    AV Peak Velocity 1.4 m/s    AV Peak Gradient 8 mmHg    AV Area by VTI 2.1 cm2    AV Area by Peak Velocity 2.1 cm2    Aortic Root 2.8 cm    Ascending Aorta 3.2 cm    MV E Wave Deceleration Time 182.0 ms    MV A Velocity 0.87 m/s    MV E Velocity 0.54 m/s    MV Mean Velocity 0.6 m/s    MV Mean Gradient 1 mmHg    MV VTI 22.0 cm    MV Max Velocity 0.9 m/s    MV Peak Gradient 3 mmHg    MV Area by VTI 2.4 cm2    PV .0 ms    PV Max Velocity 0.9 m/s    PV Peak Gradient 3 mmHg    RV Basal Dimension 3.1 cm    RV Free Wall Peak S' 15 cm/s    TAPSE 2.2 1.7 cm    TR Max Velocity 2.56 m/s    TR Peak Gradient 26 mmHg    Fractional Shortening 2D 35 28 - 44 %    LV RWT Ratio 0.42     LV Mass 2D 132.7 67 - 162 g    MV E/A 0.62     E/E' Ratio (Averaged) 7.50     E/E' Lateral 6.00     E/E' Septal 9.00     LA/AO Root Ratio 1.18     AV Velocity Ratio 0.64     LVOT:AV VTI Index 0.66     MV:LVOT VTI Index 1.33     Est. RA Pressure 5 mmHg    RVSP 31 mmHg       I have personally reviewed imaging studies:  Other Studies:  CT HEAD WO CONTRAST   Final Result   -No acute intracranial abnormality.   -Small-volume fluid within the sphenoid sinuses.             MRI BRAIN WO CONTRAST    (Results Pending)       Current Meds:  Current Facility-Administered Medications   Medication Dose Route Frequency    albuterol sulfate HFA (PROVENTIL;VENTOLIN;PROAIR) 108 (90 Base) MCG/ACT inhaler 2 puff  2 puff Inhalation Q6H PRN    busPIRone (BUSPAR) tablet 10 mg  10 mg Oral BID    montelukast (SINGULAIR) tablet 10 mg  10 mg Oral QHS    ondansetron (ZOFRAN-ODT) disintegrating tablet 4 mg  4 mg Oral Q8H PRN    Or    ondansetron (ZOFRAN) injection 4 mg  4 mg IntraVENous Q6H PRN    polyethylene glycol (GLYCOLAX) packet 17 g  17 g Oral Daily PRN    aspirin EC tablet 81 mg  81 mg Oral Daily    Or    aspirin suppository 300 mg  300 mg Rectal Daily atorvastatin (LIPITOR) tablet 80 mg  80 mg Oral Nightly    apixaban (ELIQUIS) tablet 5 mg  5 mg Oral BID       Signed:  APPLE Alston - CNP No

## 2025-04-29 ENCOUNTER — OFFICE VISIT (OUTPATIENT)
Age: 75
End: 2025-04-29
Payer: MEDICARE

## 2025-04-29 VITALS
HEART RATE: 74 BPM | WEIGHT: 157 LBS | BODY MASS INDEX: 26.16 KG/M2 | HEIGHT: 65 IN | DIASTOLIC BLOOD PRESSURE: 70 MMHG | SYSTOLIC BLOOD PRESSURE: 108 MMHG

## 2025-04-29 DIAGNOSIS — E78.01 FAMILIAL HYPERCHOLESTEROLEMIA: ICD-10-CM

## 2025-04-29 DIAGNOSIS — Z79.01 CHRONIC ANTICOAGULATION: ICD-10-CM

## 2025-04-29 DIAGNOSIS — Z86.73 H/O: CVA (CEREBROVASCULAR ACCIDENT): ICD-10-CM

## 2025-04-29 DIAGNOSIS — I48.0 PAROXYSMAL ATRIAL FIBRILLATION (HCC): Primary | ICD-10-CM

## 2025-04-29 PROCEDURE — 1036F TOBACCO NON-USER: CPT | Performed by: INTERNAL MEDICINE

## 2025-04-29 PROCEDURE — 1159F MED LIST DOCD IN RCRD: CPT | Performed by: INTERNAL MEDICINE

## 2025-04-29 PROCEDURE — 99214 OFFICE O/P EST MOD 30 MIN: CPT | Performed by: INTERNAL MEDICINE

## 2025-04-29 PROCEDURE — 1090F PRES/ABSN URINE INCON ASSESS: CPT | Performed by: INTERNAL MEDICINE

## 2025-04-29 PROCEDURE — 1125F AMNT PAIN NOTED PAIN PRSNT: CPT | Performed by: INTERNAL MEDICINE

## 2025-04-29 PROCEDURE — 1160F RVW MEDS BY RX/DR IN RCRD: CPT | Performed by: INTERNAL MEDICINE

## 2025-04-29 PROCEDURE — G8419 CALC BMI OUT NRM PARAM NOF/U: HCPCS | Performed by: INTERNAL MEDICINE

## 2025-04-29 PROCEDURE — 1123F ACP DISCUSS/DSCN MKR DOCD: CPT | Performed by: INTERNAL MEDICINE

## 2025-04-29 PROCEDURE — G8399 PT W/DXA RESULTS DOCUMENT: HCPCS | Performed by: INTERNAL MEDICINE

## 2025-04-29 PROCEDURE — G8427 DOCREV CUR MEDS BY ELIG CLIN: HCPCS | Performed by: INTERNAL MEDICINE

## 2025-04-29 RX ORDER — METOPROLOL SUCCINATE 25 MG/1
25 TABLET, EXTENDED RELEASE ORAL DAILY
Qty: 90 TABLET | Refills: 3 | Status: SHIPPED | OUTPATIENT
Start: 2025-04-29

## 2025-04-29 NOTE — PATIENT INSTRUCTIONS
Take your metoprolol-succinate version 25 mg once every evening-best time to take it to prevent fatigue and also because A-fib most commonly arises/appears at night.

## 2025-04-29 NOTE — PROGRESS NOTES
[facility] CARDIOLOGY  [address] [place] [address], [address]  [place] [address]  PHONE: [phone]          25    NAME:  Patricia Hale  : 1950  MRN: [id number]         SUBJECTIVE:   Patricia Hale is a 75 y.o. female seen for a visit regarding the following:     Chief Complaint   Patient presents with    Atrial Fibrillation           HPI:    Cardio problem list:  1.  Paroxysmal atrial [id number] score of 5-on metoprolol and Eliquis-diagnosed 2023-[facility] admission  2.  Left cerebellar hemisphere CVA-initial event around 2022, repeat event around January 15 and a third event in 2023-admitted to [facility], again in 2023  -MR angiogram of the head and neck from-2023 with no significant obstructive disease.  3.  Hyperlipidemia-familial  4.  GERD  5.  Asthma  6. FAISAL- on mandibular advancement device    I saw Ms. Hale was a pleasant 75-year-old woman in cardiovascular follow-up for paroxysmal atrial fibrillation, history of prior CVA involving left cerebellar hemisphere, hyperlipidemia, known GERD and asthma.    We last met with her 6 months ago at which time we made no changes with her medical therapy.  We previously put her back on metoprolol succinate 25 mg once daily.  We told her she could take it every evening to prevent much fatigue.    History of TIA-she had an episode which was concerning for a TIA in 2024-met with her primary neurologist-underwent an MRI of the brain and a CT angiogram of the head and neck which showed no concerning findings earlier this month.  She said she went on a strenuous hike which was downhill going out and it was all uphill coming back in.  She became very nauseous which is potentially from the effort related to the headache.    Paroxysmal atrial fibrillation: Denies any significant palpitations .  Complains of some fatigue and was wondering if this was from metoprolol.  I told her to take it every evening as

## 2025-05-30 ENCOUNTER — TRANSCRIBE ORDERS (OUTPATIENT)
Dept: SCHEDULING | Age: 75
End: 2025-05-30

## 2025-05-30 DIAGNOSIS — Z78.0 ASYMPTOMATIC MENOPAUSAL STATE: Primary | ICD-10-CM

## 2025-05-30 DIAGNOSIS — Z12.31 ENCOUNTER FOR SCREENING MAMMOGRAM FOR MALIGNANT NEOPLASM OF BREAST: Primary | ICD-10-CM

## 2025-06-26 ENCOUNTER — HOSPITAL ENCOUNTER (OUTPATIENT)
Dept: MAMMOGRAPHY | Age: 75
Discharge: HOME | End: 2025-06-29
Payer: MEDICARE

## 2025-06-26 VITALS — BODY MASS INDEX: 24.99 KG/M2 | WEIGHT: 150 LBS | HEIGHT: 65 IN

## 2025-06-26 DIAGNOSIS — Z78.0 ASYMPTOMATIC MENOPAUSAL STATE: ICD-10-CM

## 2025-06-26 DIAGNOSIS — Z12.31 ENCOUNTER FOR SCREENING MAMMOGRAM FOR MALIGNANT NEOPLASM OF BREAST: ICD-10-CM

## 2025-06-26 PROCEDURE — 77063 BREAST TOMOSYNTHESIS BI: CPT

## 2025-06-26 PROCEDURE — 77080 DXA BONE DENSITY AXIAL: CPT

## 2025-07-21 ENCOUNTER — OFFICE VISIT (OUTPATIENT)
Dept: NEUROLOGY | Age: 75
End: 2025-07-21
Payer: MEDICARE

## 2025-07-21 DIAGNOSIS — I63.332 CEREBROVASCULAR ACCIDENT (CVA) DUE TO THROMBOSIS OF LEFT POSTERIOR CEREBRAL ARTERY (HCC): Primary | ICD-10-CM

## 2025-07-21 PROCEDURE — G8399 PT W/DXA RESULTS DOCUMENT: HCPCS | Performed by: PSYCHIATRY & NEUROLOGY

## 2025-07-21 PROCEDURE — 1036F TOBACCO NON-USER: CPT | Performed by: PSYCHIATRY & NEUROLOGY

## 2025-07-21 PROCEDURE — 1159F MED LIST DOCD IN RCRD: CPT | Performed by: PSYCHIATRY & NEUROLOGY

## 2025-07-21 PROCEDURE — 99214 OFFICE O/P EST MOD 30 MIN: CPT | Performed by: PSYCHIATRY & NEUROLOGY

## 2025-07-21 PROCEDURE — G8420 CALC BMI NORM PARAMETERS: HCPCS | Performed by: PSYCHIATRY & NEUROLOGY

## 2025-07-21 PROCEDURE — 1160F RVW MEDS BY RX/DR IN RCRD: CPT | Performed by: PSYCHIATRY & NEUROLOGY

## 2025-07-21 PROCEDURE — 1123F ACP DISCUSS/DSCN MKR DOCD: CPT | Performed by: PSYCHIATRY & NEUROLOGY

## 2025-07-21 PROCEDURE — 1090F PRES/ABSN URINE INCON ASSESS: CPT | Performed by: PSYCHIATRY & NEUROLOGY

## 2025-07-21 PROCEDURE — G8427 DOCREV CUR MEDS BY ELIG CLIN: HCPCS | Performed by: PSYCHIATRY & NEUROLOGY

## 2025-07-21 ASSESSMENT — ENCOUNTER SYMPTOMS
EYES NEGATIVE: 1
GASTROINTESTINAL NEGATIVE: 1
RESPIRATORY NEGATIVE: 1

## 2025-07-21 NOTE — PROGRESS NOTES
[facility] NEUROLOGY  [address], [address]  [place] [address]  Phone: [phone] Fax [phone]  Dr. Blake Jamil      7/21/2025  Patricia Hale     Patient is referred by the following provider for consultation regarding as below:       I reviewed the available records and notes and have examined patient with the following findings:     Chief Complaint:  No chief complaint on file.         HPI: This is a right handed 75 y.o. Single female who is very pleasant very appropriate patient unfortunately on November 2022 she had been diagnosed with A-fib and was on Eliquis and had abrupt onset of nausea severe vomiting dizziness and balance problems.  She went to the emergency room they just felt it was blood pressure related they did not even scan her.  July 2023 she is in the emergency room with similar symptoms they did an MRI and found an old left cerebellar stroke and that is most probably what happened in 2022.  The MRA at that time July 2023 was normal we also did a CTA of the head and neck in 2024 did not show any blockages.  She is on Eliquis and Crestor for stroke prevention and been doing very well.  She still doing very well.  She has been exercising and walking.  And is questioning whether she should try senior yoga which I think would be an excellent choice.  She also hips have fatigue and and mild sleep apnea or been diagnosed with sleep apnea not sure but is mild.  She could not try light CPAP but is interested at one point of the oral appliance that she is aware.    IMAGING REVIEW:  I REVIEWED PERTINENT  IMAGES AND REPORTS WITH THE PATIENT PERSONALLY, DIRECTLY AND FULLY.     Past Medical History:  Past Medical History:   Diagnosis Date    Allergic rhinitis     Anxiety     Arthritis     Asthma     pt uses inhaler daily    Breast cancer ([facility]) 1999    Lt Lumpectomy    Bronchitis     pt taking levaquin; started medication on 1/11/13    Cancer ([facility]) 1999    left breast; s/p left breast